# Patient Record
Sex: FEMALE | Race: WHITE | Employment: FULL TIME | URBAN - METROPOLITAN AREA
[De-identification: names, ages, dates, MRNs, and addresses within clinical notes are randomized per-mention and may not be internally consistent; named-entity substitution may affect disease eponyms.]

---

## 2020-08-27 ENCOUNTER — OFFICE VISIT (OUTPATIENT)
Dept: ENDOCRINOLOGY | Age: 40
End: 2020-08-27
Payer: COMMERCIAL

## 2020-08-27 VITALS
HEIGHT: 62 IN | BODY MASS INDEX: 36.07 KG/M2 | OXYGEN SATURATION: 98 % | DIASTOLIC BLOOD PRESSURE: 63 MMHG | SYSTOLIC BLOOD PRESSURE: 125 MMHG | RESPIRATION RATE: 14 BRPM | WEIGHT: 196 LBS | HEART RATE: 66 BPM | TEMPERATURE: 97.3 F

## 2020-08-27 PROBLEM — E04.2 MULTINODULAR GOITER: Status: ACTIVE | Noted: 2020-08-27

## 2020-08-27 PROBLEM — E28.8 HYPERANDROGENISM: Status: ACTIVE | Noted: 2020-08-27

## 2020-08-27 PROBLEM — R63.5 WEIGHT GAIN, ABNORMAL: Status: ACTIVE | Noted: 2020-08-27

## 2020-08-27 PROBLEM — E66.812 CLASS 2 OBESITY IN ADULT: Status: ACTIVE | Noted: 2020-08-27

## 2020-08-27 PROBLEM — L65.9 ALOPECIA: Status: ACTIVE | Noted: 2020-08-27

## 2020-08-27 PROBLEM — E66.9 CLASS 2 OBESITY IN ADULT: Status: ACTIVE | Noted: 2020-08-27

## 2020-08-27 PROCEDURE — 99204 OFFICE O/P NEW MOD 45 MIN: CPT | Performed by: INTERNAL MEDICINE

## 2020-08-27 RX ORDER — NORETHINDRONE ACETATE AND ETHINYL ESTRADIOL 1MG-20(24)
1 KIT ORAL DAILY
COMMUNITY
Start: 2020-07-02 | End: 2021-12-10 | Stop reason: ALTCHOICE

## 2020-08-27 RX ORDER — DESVENLAFAXINE 25 MG/1
TABLET, EXTENDED RELEASE ORAL
COMMUNITY
Start: 2020-08-03

## 2020-08-27 RX ORDER — FOLIC ACID 0.8 MG
TABLET ORAL
COMMUNITY

## 2020-08-27 NOTE — PROGRESS NOTES
SUBJECTIVE:  Abeba Morales is a 46 y.o. female who is being evaluated for multinodular goiter, fatigue, hair loss. Referred by Dr. Dontrell Matta. 1. Multinodular goiter     This started in 2011. Patient was diagnosed with MNG. The problem has been unchanged. Previous thyroid studies include: TSH and free thyroxine. Patient started medication in N/A. Currently patient is on: N/A. Misses N/A doses a month. Patient was prescribed levothyroxine, Synthroid in the past and developed rash, hot spots, itching. She took them only for few weeks    Current complaints: fatigue, weight gain, hair loss, hirsutism, fluctuating mood, constipation, difficulty loosing weight, dry skin, itching    History of obstructive symptoms: difficulty swallowing No, changes in voice/hoarseness No.  History of radiation to patient's neck: No  Resent iodine exposure: No  Family history includes hypothyroidism. Family history of thyroid cancer: No    2. Hyperandrogenism    Differential diagnosis includes PCOS. On BCP  Started periods at 8 yo. Regular, heavy, alternating with spotting. In 2017 started BCP. No periods on BCP. Hirsutism on the face, abdomen. Shoe size changed by 1/2 size, ring size changed  Has decreased sex drive    3. Class 2 obesity with body mass index (BMI) of 35.0 to 35.9 in adult, unspecified obesity type, unspecified whether serious comorbidity present    Gained 15 lbs in 8 lbs. Walking daily  On low carb diet    4. Alopecia  Patient developed severe hair loss. 5. Weight gain, abnormal  Gained 15 lbs in 8 lbs. Patient states that she eats healthy and walks every day. [facility] NON [facility]  Result Impression   1.  Simple right ovarian cyst measuring 2.7 x 2.7 x 2.7 cm.  2.  Otherwise negative transvaginal ultrasound of the pelvis.          1. ACR TI-RADS risk category: TR3 (3 points), mildly suspicious,  for the left nodule with maximum dimension of 1.0 cm. Recommendation: No further imaging follow-up. Clinical followup as necessary.        Reference: ACR TI-RADS recommendations (Lillysler et al., May 2017):  TR5 (> or = 7 points): FNA if  > or = to 1 cm longest axis, follow-up if 0.5 - 0.9 cm every year for 5 years  TR4 (4-6 points): FNA if  > or = to 1.5 cm longest axis, follow-up if 1 - 1.4 cm in 1, 2, 3 and 5 years  TR3 (3 points): FNA if  > or = to 2.5 cm longest axis, follow-up if 1.5 - 2.4 cm in 1, 3 and 5 years  TR2 (2 points) & TR1 (0 points): No FNA or follow-up recommended    Electronically Signed by: Reyes Salvador MD, 8/13/2020 7:43 PM   Result Narrative   EXAMINATION: US-THYROID / NECK     DATE OF EXAM:  8/13/2020 6:31 PM    DEMOGRAPHICS: 46years old Female     INDICATION:     E04.2: Nontoxic multinodular goiter     Reason for Exam:  Nontoxic multinodular goiter. TECHNIQUE: Ultrasound examination of the thyroid was performed. COMPARISON: None. FINDINGS:  Thyroid Size: Right lobe: 4.8 x 1.7 x 1.5 cm; Left lobe: 5.3 x 1.4 x 1.3 cm; Isthmus: 4 mm  Texture: Homogeneous  Vascularity: Normal  Total number of nodules of any character:        Nodule #: 1  Size (3 dimensions): 1.0 x 0.7 x 0.7 cm  Location: left      Composition: solid/almost completely solid (2)  Echogenicity: isoechoic (1)  Shape: not taller-than-wide (0)  Margins: smooth (0)  Echogenic foci: none (0)  Total points and TR risk for above nodule: 3 points. ACR TI-RADS 3. Mildly suspicious. Nodule #: 2  Size (3 dimensions): 0.7 x 0.4 x 0.4 cm  Location: right      Composition: mixed cystic and solid (1)  Echogenicity: hypoechoic (2)  Shape: not taller-than-wide (0)  Margins: smooth (0)  Echogenic foci: large comet-tail artifacts (0)  Total points and TR risk for above nodule: 3 points. ACR TI-RADS 3. Mildly suspicious. Additional findings: Additional nonspecific less than 0.5 cm nodules are identified in each lobe.          US-THYROID / [facility]3] None    Highest education level: None   Occupational History    None   Social Needs    Financial resource strain: None    Food insecurity     Worry: None     Inability: None    Transportation needs     Medical: None     Non-medical: None   Tobacco Use    Smoking status: Former Smoker     Packs/day: 1.00     Years: 5.00     Pack years: 5.00     Types: Cigarettes     Last attempt to quit: 2011     Years since quittin.0    Smokeless tobacco: Never Used   Substance and Sexual Activity    Alcohol use: Yes     Comment: 2-3 per week    Drug use: Never    Sexual activity: Yes     Partners: Male   Lifestyle    Physical activity     Days per week: None     Minutes per session: None    Stress: None   Relationships    Social connections     Talks on phone: None     Gets together: None     Attends Synagogue service: None     Active member of club or organization: None     Attends meetings of clubs or organizations: None     Relationship status: None    Intimate partner violence     Fear of current or ex partner: None     Emotionally abused: None     Physically abused: None     Forced sexual activity: None   Other Topics Concern    None   Social History Narrative    None     Current Outpatient Medications   Medication Sig Dispense Refill    B COMPLEX VITAMINS ER PO Take 1 capsule by mouth daily      desvenlafaxine succinate (PRISTIQ) 25 MG TB24 extended release tablet TAKE 1 TABLET BY MOUTH EVERY DAY      Norethin Ace-Eth Estrad-FE (BLISOVI 24 FE) 1-20 MG-MCG(24) TABS Take 1 tablet by mouth daily      Multiple Vitamins-Minerals (MULTIVITAMIN ADULT PO) Take by mouth      Magnesium 500 MG CAPS Take by mouth      BIOTIN PO Take by mouth       No current facility-administered medications for this visit.       Allergies   Allergen Reactions    Propoxyphene Anaphylaxis    Levothyroxine Rash    Duloxetine      FATIGUE    Sulfa Antibiotics      \"insomnia\"    Codeine Rash    Erythromycin Rash     ABLE kg)     Body mass index is 35.85 kg/m².     OBJECTIVE:  Constitutional: no acute distress, well appearing and well nourished  Psychiatric: oriented to person, place and time, judgement and insight and normal, recent and remote memory and intact and mood and affect are normal  Skin: skin and subcutaneous tissue is normal without mass, normal turgor, has white stria  Head and Face: examination of head and face revealed no abnormalities  Eyes: no lid or conjunctival swelling, erythema or discharge, pupils are normal, equal, round, reactive to light  Ears/Nose: external inspection of ears and nose revealed no abnormalities, hearing is grossly normal  Oropharynx/Mouth/Face: lips, tongue and gums are normal with no lesions, the voice quality was normal  Neck: neck is supple and symmetric, with midline trachea and no masses, thyroid is enlarged  Lymphatics: normal cervical lymph nodes, normal supraclavicular nodes  Pulmonary: no increased work of breathing or signs of respiratory distress, lungs are clear to auscultation  Cardiovascular: normal heart rate and rhythm, normal S1 and S2, no murmurs and pedal pulses and 2+ bilaterally, No edema  Abdomen: abdomen is soft, non-tender with no masses  Musculoskeletal: normal gait and station and exam of the digits and nails are normal  Neurological: normal coordination and normal general cortical function      Lab Review:    No results found for: WBC, HGB, HCT, MCV, PLT  No results found for: NA, K, CL, CO2, BUN, CREATININE, GLUCOSE, CALCIUM, PROT, LABALBU, BILITOT, ALKPHOS, AST, ALT, LABGLOM, GFRAA, AGRATIO, GLOB  No results found for: TSHFT4, TSH, FT3  No results found for: LABA1C  No results found for: EAG  No results found for: CHOL  No results found for: TRIG  No results found for: HDL  No results found for: LDLCHOLESTEROL, LDLCALC  No results found for: LABVLDL, VLDL  No results found for: CHOLHDLRATIO  No results found for: LABMICR, [id number]  No results found for: VITD25 ASSESSMENT/PLAN/recommendations:  1. Multinodular goiter  Recommend follow-up for bilateral thyroid nodules  Patient states that her previous nodule was benign and the disappeared on palpation after it was aspirated in 2011  Reviewed medical record in detail. There was no cytology report. There was a note stating thyroid biopsy on 8/12/2011- cyst on the thyroid. 8/13/2020 thyroid ultrasound  - T3, Free; Future  - T4, Free; Future  - Anti-Thyroglobulin Antibody; Future  - Thyroid Peroxidase Antibody; Future  - TSH without Reflex; Future  -Thyroid sonogram    2. Class 2 obesity with body mass index (BMI) of 35.0 to 35.9 in adult, unspecified obesity type, unspecified whether serious comorbidity present  Diet and exercise    3. Hyperandrogenism  Differential diagnosis includes polycystic ovarian syndrome, patient stated that diagnosis was mentioned by OB/GYN provider  She will obtain lab work 1 month after stopping birth control pills  Then reevaluate  Patient is concerned about abnormal hair growth, hair loss, if birth control pills are causing weight gain, difficulty losing weight  - Comprehensive Metabolic Panel; Future  - T3, Free; Future  - T4, Free; Future  - Anti-Thyroglobulin Antibody; Future  - Thyroid Peroxidase Antibody; Future  - TSH without Reflex; Future  - DHEA-Sulfate; Future  - Testosterone, free, total; Future  - 17-Hydroxyprogesterone; Future  - Prolactin; Future  - ACTH; Future  - Cortisol AM, Total; Future  - Follicle Stimulating Hormone; Future  - Luteinizing Hormone; Future  - Estradiol; Future  - Insulin-Like Growth Factor; Future  - ANTI MULLERIAN HORMONE; Future  - SALIVARY CORTISOL; Future  - SALIVARY CORTISOL; Future  - SALIVARY CORTISOL; Future    4. Alopecia    - Comprehensive Metabolic Panel; Future  - T3, Free; Future  - T4, Free; Future  - Anti-Thyroglobulin Antibody; Future  - Thyroid Peroxidase Antibody; Future  - TSH without Reflex; Future  - DHEA-Sulfate;  Future  - Testosterone, free, total; Future  - 17-Hydroxyprogesterone; Future    5. Weight gain, abnormal  Stop Blisovi for 1 month for testing  - Comprehensive Metabolic Panel; Future  - T3, Free; Future  - T4, Free; Future  - Anti-Thyroglobulin Antibody; Future  - Thyroid Peroxidase Antibody; Future  - TSH without Reflex; Future  - DHEA-Sulfate; Future  - Testosterone, free, total; Future  - 17-Hydroxyprogesterone; Future  - Prolactin; Future  - ACTH; Future  - Cortisol AM, Total; Future  - Follicle Stimulating Hormone; Future  - Luteinizing Hormone; Future  - Estradiol; Future  - Insulin-Like Growth Factor; Future  - ANTI MULLERIAN HORMONE; Future  - SALIVARY CORTISOL; Future  - SALIVARY CORTISOL; Future  - SALIVARY CORTISOL; Future      Reviewed and/or ordered clinical lab results Yes  Reviewed and/or ordered radiology tests Yes   Reviewed and/or ordered other diagnostic tests No  Discussed test results with performing physician No  Independently reviewed image, tracing, or specimen No  Made a decision to obtain old records No  Reviewed and summarized old records Yes    Thyroid disease   thyroid biopsy 8/12/11- cyst on thyroid   8/13/2020 thyroid ultrasound  Left 1 cm and right 0.7 cm thyroid nodules  25 hydroxy vitamin D 48.1  TSH 1.028  Hemoglobin A1c 5.8  Cortisol a.m. 16.1  TPO antibody and thyroglobulin antibodies negative  FINDINGS:    UTERUS: Retroverted and measures 5.7 x 4.4 x 3.5 cm. The endometrial stripe thickness measures up to 3.5 mm. There are no uterine fibroids. RIGHT OVARY: Measures 4.0 x 2.4 x 3.4 cm with a volume of 16.55 mL. There is a simple right ovarian cyst measuring 2.7 x 2.7 x 2.7 cm. There is normal blood flow. LEFT OVARY: Measures 2.7 x 1.2 x 1.4 cm with a volume of 2.27 mL. Physiologic follicles are seen within the left ovary. There is normal blood flow. PELVIS: There is no free fluid.   Obtained history from other than patient Gretel Avila was counseled regarding symptoms

## 2020-09-27 LAB
A/G RATIO: 1.8 (ref 1–2)
ALBUMIN SERPL-MCNC: 6.8 G/DL (ref 6–8.3)
ALBUMIN SERUM: 4.4 G/DL (ref 3.5–5.7)
ALP BLD-CCNC: 62 U/L (ref 34–104)
ALT SERPL-CCNC: 32 U/L (ref 7–52)
ANION GAP SERPL CALCULATED.3IONS-SCNC: 8 MMOL/L (ref 7–16)
AST SERPL-CCNC: 21 U/L (ref 15–39)
BILIRUBIN: 0.4 MG/DL (ref 0.3–1)
BUN BLDV-MCNC: 16 MG/DL (ref 7–25)
CALCIUM SERPL-MCNC: 9.1 MG/DL (ref 8.6–10.2)
CHLORIDE BLD-SCNC: 107 MMOL/L (ref 98–107)
CO2: 24 MMOL/L (ref 21–31)
CORTISOL - AM: 9.8 UG/DL (ref 6.7–22.6)
CREATININE + EGFR PANEL: 0.8 MG/DL (ref 0.6–1.2)
ESTRADIOL, SENSITIVE: 26.9 PG/ML
FOLLICLE STIMULATING HORMONE: 6.6 MU/ML
GFR AFRICAN AMERICAN: > 60 ML/MIN/1.73M2
GFR NON-AFRICAN AMERICAN: > 60 ML/MIN/1.73M2
GLOBULIN: 2.4 G/DL (ref 2.6–4.2)
GLUCOSE: 142 MG/DL (ref 74–109)
LUTEINIZING HORMONE: 2.3 MIU/ML
POTASSIUM SERPL-SCNC: 3.9 MMOL/L (ref 3.5–5.3)
PROLACTIN: 4.5 NG/ML (ref 3.3–26.7)
SODIUM BLD-SCNC: 139 MMOL/L (ref 136–145)
T3 FREE: 3.2 PG/ML (ref 2.5–3.9)
T4 FREE: 0.74 NG/DL (ref 0.61–1.12)
TSH SERPL DL<=0.05 MIU/L-ACNC: 0.88 UIU/ML (ref 0.45–5.33)

## 2020-09-29 LAB
ADRENOCORTICOTROPIC HORMONE: 35.3 PG/ML (ref 7.2–63.3)
DHEAS (DHEA SULFATE): 230 UG/DL (ref 57.3–279.2)
THYROGLOBULIN AB: <1 IU/ML (ref 0–0.9)
THYROID PEROXIDASE ANTIBODIES: <9 IU/ML (ref 0–34)
THYROID PEROXIDASE ANTIBODIES: <9 IU/ML (ref 0–34)

## 2020-09-30 LAB
INSULIN-LIKE GROWTH FACTOR-1: 120 NG/ML (ref 79–259)
TESTOSTERONE FREE-MALE: 9.4 PG/ML (ref 0–4.2)
TESTOSTERONE TOTAL-MALE: 51 NG/DL (ref 8–48)

## 2020-10-02 LAB — Lab: 1.81 NG/ML

## 2020-10-09 ENCOUNTER — OFFICE VISIT (OUTPATIENT)
Dept: ENDOCRINOLOGY | Age: 40
End: 2020-10-09
Payer: COMMERCIAL

## 2020-10-09 VITALS
BODY MASS INDEX: 36.44 KG/M2 | OXYGEN SATURATION: 100 % | HEIGHT: 62 IN | WEIGHT: 198 LBS | SYSTOLIC BLOOD PRESSURE: 130 MMHG | DIASTOLIC BLOOD PRESSURE: 88 MMHG | TEMPERATURE: 97.2 F | RESPIRATION RATE: 14 BRPM | HEART RATE: 73 BPM

## 2020-10-09 PROBLEM — E11.9 CONTROLLED TYPE 2 DIABETES MELLITUS (HCC): Status: ACTIVE | Noted: 2020-10-09

## 2020-10-09 PROBLEM — E28.2 PCOS (POLYCYSTIC OVARIAN SYNDROME): Status: ACTIVE | Noted: 2020-08-27

## 2020-10-09 PROCEDURE — 99215 OFFICE O/P EST HI 40 MIN: CPT | Performed by: INTERNAL MEDICINE

## 2020-10-09 NOTE — PROGRESS NOTES
SUBJECTIVE:  Kathleen Mccullough is a 45 y.o. female who is being evaluated for multinodular goiter, fatigue, hair loss. 1. Multinodular goiter     This started in 2011. Patient was diagnosed with MNG. The problem has been unchanged. Previous thyroid studies include: TSH and free thyroxine. Patient started medication in N/A. Currently patient is on: N/A. Misses N/A doses a month. Patient was prescribed levothyroxine, Synthroid in the past and developed rash, hot spots, itching. She took them only for few weeks    Current complaints: fatigue, weight gain, hair loss, hirsutism, fluctuating mood, constipation, difficulty loosing weight, dry skin, itching    History of obstructive symptoms: difficulty swallowing No, changes in voice/hoarseness No.  History of radiation to patient's neck: No  Resent iodine exposure: No  Family history includes hypothyroidism. Family history of thyroid cancer: No    2. Hyperandrogenism    Differential diagnosis includes PCOS. On BCP  Started periods at 6 yo. Regular, heavy, alternating with spotting. In 2017 started BCP. No periods on BCP. Hirsutism on the face, abdomen. Shoe size changed by 1/2 size, ring size changed  Has decreased sex drive    3. Obesity  Gained 15 lbs in 8 lbs. Walking daily  On low carb diet    4. Alopecia  Patient developed severe hair loss. 5. Weight gain, abnormal  Gained 15 lbs  Patient states that she eats healthy and walks every day. 6.  Type 2 Diabetes Mellitus, controlled  Glucose 142  Dx with Type 2 diabetes in 2008  Took Metformin for a while  Stopped because diabetes  was controlled    7. PCOS  Has hirsutism, irregular periods, hair loss, acne       [facility]12[facility]2018  [facility]  Result Impression   1.  Simple right ovarian cyst measuring 2.7 x 2.7 x 2.7 cm.  2.  Otherwise negative transvaginal ultrasound of the pelvis.          1. ACR TI-RADS risk category: TR3 (3 points), mildly suspicious,  for the left nodule with maximum dimension of 1.0 cm. Recommendation: No further imaging follow-up. Clinical followup as necessary.        Reference: ACR TI-RADS recommendations (Tessler et al., May 2017):  TR5 (> or = 7 points): FNA if  > or = to 1 cm longest axis, follow-up if 0.5 - 0.9 cm every year for 5 years  TR4 (4-6 points): FNA if  > or = to 1.5 cm longest axis, follow-up if 1 - 1.4 cm in 1, 2, 3 and 5 years  TR3 (3 points): FNA if  > or = to 2.5 cm longest axis, follow-up if 1.5 - 2.4 cm in 1, 3 and 5 years  TR2 (2 points) & TR1 (0 points): No FNA or follow-up recommended    Electronically Signed by: Diana Weinstein MD, 8/13/2020 7:43 PM   Result Narrative   EXAMINATION: US-THYROID / NECK     DATE OF EXAM:  8/13/2020 6:31 PM    DEMOGRAPHICS: 46years old Female     INDICATION:     E04.2: Nontoxic multinodular goiter     Reason for Exam:  Nontoxic multinodular goiter. TECHNIQUE: Ultrasound examination of the thyroid was performed. COMPARISON: None. FINDINGS:  Thyroid Size: Right lobe: 4.8 x 1.7 x 1.5 cm; Left lobe: 5.3 x 1.4 x 1.3 cm; Isthmus: 4 mm  Texture: Homogeneous  Vascularity: Normal  Total number of nodules of any character:        Nodule #: 1  Size (3 dimensions): 1.0 x 0.7 x 0.7 cm  Location: left      Composition: solid/almost completely solid (2)  Echogenicity: isoechoic (1)  Shape: not taller-than-wide (0)  Margins: smooth (0)  Echogenic foci: none (0)  Total points and TR risk for above nodule: 3 points. ACR TI-RADS 3. Mildly suspicious. Nodule #: 2  Size (3 dimensions): 0.7 x 0.4 x 0.4 cm  Location: right      Composition: mixed cystic and solid (1)  Echogenicity: hypoechoic (2)  Shape: not taller-than-wide (0)  Margins: smooth (0)  Echogenic foci: large comet-tail artifacts (0)  Total points and TR risk for above nodule: 3 points. ACR TI-RADS 3. Mildly suspicious. Additional findings: Additional nonspecific less than 0.5 cm nodules are identified in each lobe. US-THYROID / [id number] [facility]  Result Narrative   FINDINGS:  Multiple sonographic images of the thyroid gland were obtained.      The right thyroid lobe measures 4.2 x 2.1 x 1.4 cm.  The left thyroid lobe measures 4.6 x 1.6 x 1.1 cm.  The isthmus measures 4.2 mm. There are multiple hypoechoic nodules diffusely scattered throughout both thyroid lobes.  In the right thyroid lobe, there is a hypoechoic oval nodule in the superior pole measuring 4.8 x 3.4 x 2.9 mm.  In the left thyroid lobe, there is a 3 mm   hypoechoic nodule in the superior pole, a 4 mm hypoechoic nodule in the mid to inferior pole, and a nodule of mixed echogenicity in the inferior pole measuring 7.5 x 6.3 x 4.9 mm.  In addition, the largest hypoechoic nodule is in the right aspect of the   isthmus measuring 1.8 x 1.3 x 0.8 cm. IMPRESSION:  The thyroid gland is enlarged with multiple nodules scattered throughout, as described above.   ** Final **    Dictated by: Jaimee Jameson               @2011 3:08 pm  Electronically Signed by: Jaimee Jameson  @ 2011 4:06 pm  Interpreted by Staff Physician: Jaimee Jameson         Past Medical History:   Diagnosis Date    Depression     Diabetes mellitus ([facility] [address]      Patient Active Problem List    Diagnosis Date Noted    Controlled type 2 diabetes mellitus ([facility] [address] 10/09/2020    Multinodular goiter 2020    Class 2 obesity in adult 2020    PCOS (polycystic ovarian syndrome) 2020    Alopecia 2020    Weight gain, abnormal 2020     Past Surgical History:   Procedure Laterality Date    BREAST LUMPECTOMY Right     begnin biopsy     SECTION      FINE NEEDLE ASPIRATION      thyroid    LEEP       Family History   Problem Relation Age of Onset    Cancer Mother         breast    Diabetes type 2  Father     Cancer Maternal Grandmother         breast    Cancer Maternal Grandfather         colon     Social History BIOTIN PO Take by mouth       No current facility-administered medications for this visit.       Allergies   Allergen Reactions    Propoxyphene Anaphylaxis    Levothyroxine Rash    Duloxetine      FATIGUE    Sulfa Antibiotics      \"insomnia\"    Codeine Rash    Erythromycin Rash     ABLE TO TOLERATE AZITHROMYCIN    Penicillins Rash     Family Status   Relation Name Status    Mother  (Not Specified)    Father  (Not Specified)    MGM  (Not Specified)    MGF  (Not Specified)       Review of Systems:  Constitutional: has fatigue, no fever, has recent weight gain, no recent weight loss, no changes in appetite  Eyes: no eye pain, no change in vision, no eye redness, no eye irritation, no double vision  Ears, nose, throat: no nasal congestion, no sore throat, no earache, no decrease in hearing, no hoarseness, no dry mouth, no sinus problems, no difficulty swallowing, no ringing in ears  Pulmonary: no shortness of breath, no wheezing, no dyspnea on exertion, no cough  Cardiovascular: no chest pain, no lower extremity edema, no orthopnea, no intermittent leg claudication, no palpitations  Gastrointestinal: no abdominal pain, no nausea, no vomiting, no diarrhea, has constipation, no dysphagia, no heartburn, no bloating  Genitourinary: no dysuria, no urinary incontinence, no urinary hesitancy, no urinary frequency, no feelings of urinary urgency, no nocturia  Musculoskeletal: no joint swelling, no joint stiffness, no joint pain, no muscle cramps, no muscle pain, no bone pain  Integument/Breast: has hair loss, no skin rashes, no skin lesions, has itching, has dry skin  Neurological: no numbness, no tingling, no weakness, no confusion, no headaches, no dizziness, no fainting, no tremors, no decrease in memory, no balance problems  Psychiatric: no anxiety, no depression, no insomnia  Hematologic/Lymphatic: no tendency for easy bleeding, no swollen lymph nodes, no tendency for easy bruising  Immunology: no seasonal allergies, no frequent infections, no frequent illnesses  Endocrine: no temperature intolerance    /88   Pulse 73   Temp 97.2 °F (36.2 °C)   Resp 14   Ht 5' 2\" (1.575 m)   Wt 198 lb (89.8 kg)   LMP 10/08/2020   SpO2 100%   BMI 36.21 kg/m²    Wt Readings from Last 3 Encounters:   10/09/20 198 lb (89.8 kg)   08/27/20 196 lb (88.9 kg)     Body mass index is 36.21 kg/m².     OBJECTIVE:  Constitutional: no acute distress, well appearing and well nourished  Psychiatric: oriented to person, place and time, judgement and insight and normal, recent and remote memory and intact and mood and affect are normal  Skin: skin and subcutaneous tissue is normal without mass, normal turgor, has white stria  Head and Face: examination of head and face revealed no abnormalities  Eyes: no lid or conjunctival swelling, erythema or discharge, pupils are normal, equal, round, reactive to light  Ears/Nose: external inspection of ears and nose revealed no abnormalities, hearing is grossly normal  Oropharynx/Mouth/Face: lips, tongue and gums are normal with no lesions, the voice quality was normal  Neck: neck is supple and symmetric, with midline trachea and no masses, thyroid is enlarged  Lymphatics: normal cervical lymph nodes, normal supraclavicular nodes  Pulmonary: no increased work of breathing or signs of respiratory distress, lungs are clear to auscultation  Cardiovascular: normal heart rate and rhythm, normal S1 and S2, no murmurs and pedal pulses and 2+ bilaterally, No edema  Abdomen: abdomen is soft, non-tender with no masses  Musculoskeletal: normal gait and station and exam of the digits and nails are normal  Neurological: normal coordination and normal general cortical function      Lab Review:    No results found for: WBC, HGB, HCT, MCV, PLT  Lab Results   Component Value Date     09/27/2020    K 3.9 09/27/2020     09/27/2020    CO2 24 09/27/2020    BUN 16 09/27/2020    GLUCOSE 142 09/27/2020    CALCIUM 9.1 09/27/2020    LABALBU 6.8 09/27/2020    LABALBU 4.4 09/27/2020    BILITOT 0.4 09/27/2020    ALKPHOS 62 09/27/2020    AST 21 09/27/2020    ALT 32 09/27/2020    LABGLOM > 60 09/27/2020    GFRAA > 60 09/27/2020    AGRATIO 1.8 09/27/2020    GLOB 2.4 09/27/2020     Lab Results   Component Value Date    TSH 0.880 09/27/2020    FT3 3.2 09/27/2020     No results found for: LABA1C  No results found for: EAG  No results found for: CHOL  No results found for: TRIG  No results found for: HDL  No results found for: LDLCHOLESTEROL, LDLCALC  No results found for: LABVLDL, VLDL  No results found for: CHOLHDLRATIO  No results found for: LABMICR, [id number]  No results found for: VITD25     ASSESSMENT/PLAN/recommendations:  1. Multinodular goiter  TSH 0.88  8/13/2020 thyroid ultrasound  Left 1 cm and right 0.7 cm thyroid nodules  RAdiology recommendation:  No further imaging follow-up. Clinical followup as necessary.    -Thyroid sonogram    2. Obesity  Diet and exercise    3. Hyperandrogenism  Total testosterone 51  ACTH 35  Prolactin 4.5  Patient is concerned about abnormal hair growth, hair loss, acne  Patient's OB/GYN and discussed PCOS with her. Patient stated that she has PCOS according to OB/GYN.  - DHEA-Sulfate; Future  - Testosterone, free, total; Future    4. Alopecia  - Comprehensive Metabolic Panel; Future  - T3, Free; Future  - T4, Free; Future  - TSH without Reflex; Future  - DHEA-Sulfate; Future  - Testosterone, free, total; Future    5. Weight gain, abnormal  Diet, exercise    6. Diabetes mellitus type 2, controlled  Glucose 142  Management per PCP    7.   PCOS  Start metformin 500 mg daily, increase as tolerated to twice a day  -Testosterone  - DHEAS  -CMP  - Insulin    Reviewed and/or ordered clinical lab results Yes  Reviewed and/or ordered radiology tests Yes   Reviewed and/or ordered other diagnostic tests No  Discussed test results with performing physician No  Independently reviewed image, tracing, or specimen No  Made a decision to obtain old records No  Reviewed and summarized old records Yes   Recommend follow-up for bilateral thyroid nodules  Patient states that her previous nodule was benign and the disappeared on palpation after it was aspirated in 2011  Reviewed medical record in detail. There was no cytology report. There was a note stating thyroid biopsy on 8/12/2011- cyst on the thyroid.  Thyroid disease   thyroid biopsy 8/12/11- cyst on thyroid   8/13/2020 thyroid ultrasound  Left 1 cm and right 0.7 cm thyroid nodules  25 hydroxy vitamin D 48.1  TSH 1.028  Hemoglobin A1c 5.8  Cortisol a.m. 16.1  TPO antibody and thyroglobulin antibodies negative  FINDINGS:    UTERUS: Retroverted and measures 5.7 x 4.4 x 3.5 cm. The endometrial stripe thickness measures up to 3.5 mm. There are no uterine fibroids. RIGHT OVARY: Measures 4.0 x 2.4 x 3.4 cm with a volume of 16.55 mL. There is a simple right ovarian cyst measuring 2.7 x 2.7 x 2.7 cm. There is normal blood flow. LEFT OVARY: Measures 2.7 x 1.2 x 1.4 cm with a volume of 2.27 mL. Physiologic follicles are seen within the left ovary. There is normal blood flow. PELVIS: There is no free fluid. Obtained history from other than patient No    Hawa Colón was counseled regarding symptoms of multinodular goiter, PCOS, hyperandrogenism, hair loss, abnormal weight gain diagnosis, course and complications of disease if inadequately treated, side effects of medications, diagnosis, treatment options, and prognosis, risks, benefits, complications, and alternatives of treatment, labs, imaging and other studies and treatment targets and goals, polycystic ovarian syndrome diagnostic criteria, treatment options, metformin, thyroid cancer risk and thyroid nodules, monitoring. She understands instructions and counseling.     Total visit time 40 minutes, more than 50% was face-to-face counseling time  See assessment, plan and counseling note for details    Return in about 3 months (around 1/9/2021) for PCOS, thyroid problems.     Electronically signed by Lion Tellez MD on 10/9/2020 at 2:37 PM

## 2020-10-27 ENCOUNTER — PATIENT MESSAGE (OUTPATIENT)
Dept: ENDOCRINOLOGY | Age: 40
End: 2020-10-27

## 2020-10-27 NOTE — TELEPHONE ENCOUNTER
From: Matthew Esquivel  To: Nain Clay MD  Sent: 10/27/2020 7:59 AM EDT  Subject: Visit Follow-Up Question    Good Morning! I am still tolerating the Metformin just fine at 3 tablets a day. I did want to move forward with what you discussed about managing my diabetes. I need help losing weight. And I know you had discussed there being options to help me get some of this weight off. I would like to move to work with you on my diabetes as soon as possible. Can you please let me know what I need to do? Thank you, have a great day!

## 2020-11-18 NOTE — TELEPHONE ENCOUNTER
Requested Prescriptions     Pending Prescriptions Disp Refills    metFORMIN (GLUCOPHAGE) 500 MG tablet 180 tablet 1     Sig: Take 1 tablet by mouth 2 times daily (with meals)     LAST OV: 10/09/2020  LAST REFILL: 10/09/2020  NEXT OV: 12/28/2020

## 2020-12-27 PROBLEM — E11.9 DIABETES MELLITUS TYPE 2, CONTROLLED, WITHOUT COMPLICATIONS (HCC): Status: ACTIVE | Noted: 2020-12-27

## 2020-12-28 ENCOUNTER — OFFICE VISIT (OUTPATIENT)
Dept: ENDOCRINOLOGY | Age: 40
End: 2020-12-28
Payer: COMMERCIAL

## 2020-12-28 VITALS
WEIGHT: 197 LBS | TEMPERATURE: 97.4 F | HEART RATE: 60 BPM | SYSTOLIC BLOOD PRESSURE: 120 MMHG | RESPIRATION RATE: 14 BRPM | HEIGHT: 62 IN | DIASTOLIC BLOOD PRESSURE: 90 MMHG | BODY MASS INDEX: 36.25 KG/M2

## 2020-12-28 PROCEDURE — 99215 OFFICE O/P EST HI 40 MIN: CPT | Performed by: INTERNAL MEDICINE

## 2020-12-28 NOTE — PROGRESS NOTES
Margaux Arredondo is a 45 y.o. female who is being evaluated for Type 2 diabetes mellitus. Current symptoms/problems include fatigue and are worsening. Controlled type 2 diabetes mellitus without complication, without long-term current use of insulin ([facility] [E11.9]    Diagnosed with Type 2 diabetes mellitus in 2008. Comorbid conditions: none    Current diabetic medications include: Metformin 500 mg tid  Glucose 142  Took Metformin for a while  Stopped because diabetes  was controlled  Now back on Metformin    Intolerance to diabetes medications: No  Has skin reactions  Weight trend: stable  Prior visit with dietician: yes  Current diet: on average, 3 meals per day  Current exercise: active     Current monitoring regimen: home blood tests - 1 times daily  Has brought blood glucose log/meter:  No  Home blood sugar records: fasting range: 130-140 and postprandial range:    Any episodes of hypoglycemia? No  Hypoglycemia frequency and time(s):    Does patient have Glucagon emergency kit? No  Does patient have rapid acting carbohydrate? No  Does patient wear a medic alert bracelet or necklace? Yes    2. Multinodular goiter      This started in 2011. Patient was diagnosed with MNG. The problem has been unchanged. Previous thyroid studies include: TSH and free thyroxine. Patient started medication in N/A. Currently patient is on: N/A. Misses N/A doses a month.     Patient was prescribed levothyroxine, Synthroid in the past and developed rash, hot spots, itching. She took them only for few weeks     Current complaints: fatigue, hair loss, hirsutism, fluctuating mood, constipation, difficulty loosing weight, dry skin, itching     History of obstructive symptoms: difficulty swallowing No, changes in voice/hoarseness No.  History of radiation to patient's neck: No  Resent iodine exposure: No  Family history includes hypothyroidism. Family history of thyroid cancer: No     3.   PCOS  Has hirsutism, irregular periods, hair loss, acne  On BCP, continuous pack without period  Differential diagnosis includes PCOS. Started periods at 6 yo. Regular, heavy, alternating with spotting, now no period  In 2017 started BCP. No periods on BCP. Hirsutism on the face, abdomen. Shoe size changed by 1/2 size, ring size changed  Has decreased sex drive     4. Obesity  Gained 15 lbs in 8 lbs. Active  On low carb diet     5. Alopecia  Patient developed severe hair loss. US-PELVIS TRANSVAGINAL NON OB11/12/2018  [facility]  Result Impression   1.  Simple right ovarian cyst measuring 2.7 x 2.7 x 2.7 cm.  2.  Otherwise negative transvaginal ultrasound of the pelvis.            1. ACR TI-RADS risk category: TR3 (3 points), mildly suspicious,  for the left nodule with maximum dimension of 1.0 cm. Recommendation: No further imaging follow-up. Clinical followup as necessary.        Reference: ACR TI-RADS recommendations (Lillysler et al., May 2017):  TR5 (> or = 7 points): FNA if  > or = to 1 cm longest axis, follow-up if 0.5 - 0.9 cm every year for 5 years  TR4 (4-6 points): FNA if  > or = to 1.5 cm longest axis, follow-up if 1 - 1.4 cm in 1, 2, 3 and 5 years  TR3 (3 points): FNA if  > or = to 2.5 cm longest axis, follow-up if 1.5 - 2.4 cm in 1, 3 and 5 years  TR2 (2 points) & TR1 (0 points): No FNA or follow-up recommended    Electronically Signed by: Ivy Cho MD, 8/13/2020 7:43 PM   Result Narrative   EXAMINATION: US-THYROID / NECK     DATE OF EXAM:  8/13/2020 6:31 PM    DEMOGRAPHICS: 46years old Female     INDICATION:     E04.2: Nontoxic multinodular goiter     Reason for Exam:  Nontoxic multinodular goiter. TECHNIQUE: Ultrasound examination of the thyroid was performed. COMPARISON: None.     FINDINGS:  Thyroid Size: Right lobe: 4.8 x 1.7 x 1.5 cm; Left lobe: 5.3 x 1.4 x 1.3 cm; Isthmus: 4 mm  Texture: Homogeneous  Vascularity: Normal  Total number of nodules of any character:        Nodule #: 1  Size (3 dimensions): 1.0 x 0.7 x 0.7 cm  Location: left      Composition: solid/almost completely solid (2)  Echogenicity: isoechoic (1)  Shape: not taller-than-wide (0)  Margins: smooth (0)  Echogenic foci: none (0)  Total points and TR risk for above nodule: 3 points. ACR TI-RADS 3. Mildly suspicious. Nodule #: 2  Size (3 dimensions): 0.7 x 0.4 x 0.4 cm  Location: right      Composition: mixed cystic and solid (1)  Echogenicity: hypoechoic (2)  Shape: not taller-than-wide (0)  Margins: smooth (0)  Echogenic foci: large comet-tail artifacts (0)  Total points and TR risk for above nodule: 3 points. ACR TI-RADS 3. Mildly suspicious. Additional findings: Additional nonspecific less than 0.5 cm nodules are identified in each lobe.           US-THYROID / [address] [place] [address]  Result Narrative   FINDINGS:  Multiple sonographic images of the thyroid gland were obtained.      The right thyroid lobe measures 4.2 x 2.1 x 1.4 cm.  The left thyroid lobe measures 4.6 x 1.6 x 1.1 cm.  The isthmus measures 4.2 mm. There are multiple hypoechoic nodules diffusely scattered throughout both thyroid lobes.  In the right thyroid lobe, there is a hypoechoic oval nodule in the superior pole measuring 4.8 x 3.4 x 2.9 mm.  In the left thyroid lobe, there is a 3 mm   hypoechoic nodule in the superior pole, a 4 mm hypoechoic nodule in the mid to inferior pole, and a nodule of mixed echogenicity in the inferior pole measuring 7.5 x 6.3 x 4.9 mm.  In addition, the largest hypoechoic nodule is in the right aspect of the   isthmus measuring 1.8 x 1.3 x 0.8 cm. IMPRESSION:  The thyroid gland is enlarged with multiple nodules scattered throughout, as described above.   ** Final **    Dictated by: Bailey Gomes               @07/08/2011 3:08 pm  Electronically Signed by: Bailey Gomes  @ 07/08/2011 4:06 pm  Interpreted by Staff Physician: Bailey Gomes       Past Medical History:   Diagnosis Date    Depression     [facility]       Patient Active Problem List   Diagnosis    Multinodular goiter    Class 2 obesity in adult    PCOS (polycystic ovarian syndrome)    Alopecia    Diabetes mellitus type 2, controlled, without complications ([facility])     Past Surgical History:   Procedure Laterality Date    BREAST LUMPECTOMY Right     begnin biopsy     SECTION      FINE NEEDLE ASPIRATION      thyroid    LEEP       Social History     Socioeconomic History    Marital status:      Spouse name: Not on file    Number of children: Not on file    Years of education: Not on file    Highest education level: Not on file   Occupational History    Not on file   Social Needs    Financial resource strain: Not on file    Food insecurity     Worry: Not on file     Inability: Not on file    Transportation needs     Medical: Not on file     Non-medical: Not on file   Tobacco Use    Smoking status: Former Smoker     Packs/day: 1.00     Years: 5.00     Pack years: 5.00     Types: Cigarettes     Quit date: 2011     Years since quittin.3    Smokeless tobacco: Never Used   Substance and Sexual Activity    Alcohol use: Yes     Comment: 2-3 per week    Drug use: Never    Sexual activity: Yes     Partners: Male   Lifestyle    Physical activity     Days per week: Not on file     Minutes per session: Not on file    Stress: Not on file   Relationships    Social connections     Talks on phone: Not on file     Gets together: Not on file     Attends Denominational service: Not on file     Active member of club or organization: Not on file     Attends meetings of clubs or organizations: Not on file     Relationship status: Not on file    Intimate partner violence     Fear of current or ex partner: Not on file     Emotionally abused: Not on file     Physically abused: Not on file     Forced sexual activity: Not on file   Other Topics Concern    Not on file   Social History Narrative    Not on file Family History   Problem Relation Age of Onset    Cancer Mother         breast    Diabetes type 2  Father     Cancer Maternal Grandmother         breast    Cancer Maternal Grandfather         colon     Current Outpatient Medications   Medication Sig Dispense Refill    metFORMIN (GLUCOPHAGE) 500 MG tablet TAKE 2 TABLET BY MOUTH  TWICE DAILY 360 tablet 3    Probiotic Product (PROBIOTIC-10 PO) Take by mouth      B COMPLEX VITAMINS ER PO Take 1 capsule by mouth daily      desvenlafaxine succinate (PRISTIQ) 25 MG TB24 extended release tablet TAKE 1 TABLET BY MOUTH EVERY DAY      Norethin Ace-Eth Estrad-FE (BLISOVI 24 FE) 1-20 MG-MCG(24) TABS Take 1 tablet by mouth daily      Multiple Vitamins-Minerals (MULTIVITAMIN ADULT PO) Take by mouth      Magnesium 500 MG CAPS Take by mouth      BIOTIN PO Take by mouth       No current facility-administered medications for this visit.       Allergies   Allergen Reactions    Propoxyphene Anaphylaxis    Levothyroxine Rash    Duloxetine      FATIGUE    Sulfa Antibiotics      \"insomnia\"    Codeine Rash    Erythromycin Rash     ABLE TO TOLERATE AZITHROMYCIN    Penicillins Rash     Family Status   Relation Name Status    Mother  (Not Specified)    Father  (Not Specified)    MGM  (Not Specified)    MGF  (Not Specified)       Lab Review:    No results found for: WBC, HGB, HCT, MCV, PLT  Lab Results   Component Value Date     09/27/2020    K 3.9 09/27/2020     09/27/2020    CO2 24 09/27/2020    BUN 16 09/27/2020    GLUCOSE 142 09/27/2020    CALCIUM 9.1 09/27/2020    LABALBU 6.8 09/27/2020    LABALBU 4.4 09/27/2020    BILITOT 0.4 09/27/2020    ALKPHOS 62 09/27/2020    AST 21 09/27/2020    ALT 32 09/27/2020    LABGLOM > 60 09/27/2020    GFRAA > 60 09/27/2020    AGRATIO 1.8 09/27/2020    GLOB 2.4 09/27/2020     Lab Results   Component Value Date    TSH 0.880 09/27/2020    FT3 3.2 09/27/2020     No results found for: LABA1C  No results found for: EAG  No results found for: CHOL  No results found for: TRIG  No results found for: HDL  No results found for: LDLCHOLESTEROL, LDLCALC  No results found for: LABVLDL, VLDL  No results found for: CHOLHDLRATIO  No results found for: Nellene Deutscher  No results found for: VITD25     Review of Systems:  Constitutional: has fatigue, no fever, has recent weight gain, no recent weight loss, no changes in appetite  Eyes: no eye pain, no change in vision, no eye redness, no eye irritation, no double vision  Ears, nose, throat: no nasal congestion, no sore throat, no earache, no decrease in hearing, no hoarseness, no dry mouth, no sinus problems, no difficulty swallowing, no ringing in ears  Pulmonary: no shortness of breath, no wheezing, no dyspnea on exertion, no cough  Cardiovascular: no chest pain, no lower extremity edema, no orthopnea, no intermittent leg claudication, no palpitations  Gastrointestinal: no abdominal pain, no nausea, no vomiting, no diarrhea, has constipation, no dysphagia, no heartburn, no bloating  Genitourinary: no dysuria, no urinary incontinence, no urinary hesitancy, no urinary frequency, no feelings of urinary urgency, no nocturia  Musculoskeletal: no joint swelling, no joint stiffness, no joint pain, no muscle cramps, no muscle pain, no bone pain  Integument/Breast: has hair loss, no skin rashes, no skin lesions, has itching, has dry skin  Neurological: no numbness, no tingling, no weakness, no confusion, no headaches, no dizziness, no fainting, no tremors, no decrease in memory, no balance problems  Psychiatric: no anxiety, no depression, no insomnia  Hematologic/Lymphatic: no tendency for easy bleeding, no swollen lymph nodes, no tendency for easy bruising  Immunology: no seasonal allergies, no frequent infections, no frequent illnesses  Endocrine: no temperature intolerance       BP (!) 120/90   Pulse 60   Temp 97.4 °F (36.3 °C)   Resp 14   Ht 5' 2\" (1.575 m)   Wt 197 lb (89.4 kg)   BMI 36.03 kg/m²    Wt Readings from Last 3 Encounters:   12/28/20 197 lb (89.4 kg)   10/09/20 198 lb (89.8 kg)   08/27/20 196 lb (88.9 kg)     Body mass index is 36.03 kg/m². OBJECTIVE:  Constitutional: no acute distress, well appearing and well nourished  Psychiatric: oriented to person, place and time, judgement and insight and normal, recent and remote memory and intact and mood and affect are normal  Skin: skin and subcutaneous tissue is normal without mass, normal turgor, has white stria  Head and Face: examination of head and face revealed no abnormalities  Eyes: no lid or conjunctival swelling, erythema or discharge, pupils are normal, equal, round, reactive to light  Ears/Nose: external inspection of ears and nose revealed no abnormalities, hearing is grossly normal  Oropharynx/Mouth/Face: lips, tongue and gums are normal with no lesions, the voice quality was normal  Neck: neck is supple and symmetric, with midline trachea and no masses, thyroid is enlarged  Lymphatics: normal cervical lymph nodes, normal supraclavicular nodes  Pulmonary: no increased work of breathing or signs of respiratory distress, lungs are clear to auscultation  Cardiovascular: normal heart rate and rhythm, normal S1 and S2, no murmurs and pedal pulses and 2+ bilaterally, No edema  Abdomen: abdomen is soft, non-tender with no masses  Musculoskeletal: normal gait and station and exam of the digits and nails are normal  Neurological: normal coordination and normal general cortical function     Visual inspection:  Deformity/amputation: absent  Skin lesions/pre-ulcerative calluses: absent  Edema: right- negative, left- negative    Sensory exam:  Monofilament sensation: normal  (minimum of 5 random plantar locations tested, avoiding callused areas - > 1 area with absence of sensation is + for neuropathy)    Plus at least one of the following:  Pulses: normal  Proprioception: Intact  Vibration (128 Hz): Intact    ASSESSMENT/PLAN:    1. medications for diabetes treatment, side effects, benefits, PCOS. She understands instructions and counseling. These diagnosis were discussed and reviewed with the patient including the advantages of drug therapy. She was counseled at this visit on the following: diabetes complication prevention and foot care. Total visit time 40 minutes, more than 50% was counseling time  See assessment, plan and counseling note for details    Return in about 2 weeks (around 1/11/2021) for diabetes, thyroid problems.     Electronically signed by Lorelei Khan MD on 12/28/2020 at 11:14 PM

## 2021-01-05 ENCOUNTER — TELEPHONE (OUTPATIENT)
Dept: ENDOCRINOLOGY | Age: 41
End: 2021-01-05

## 2021-01-05 DIAGNOSIS — E28.2 PCOS (POLYCYSTIC OVARIAN SYNDROME): Primary | ICD-10-CM

## 2021-01-05 DIAGNOSIS — E04.2 MULTINODULAR GOITER: ICD-10-CM

## 2021-01-06 NOTE — TELEPHONE ENCOUNTER
Patient did lab work in rheumatology office. There are several missing labs. Will reorder. Patient is undergoing rheumatology work-up. She will notify me when work-up is completed. Then consider starting GLP-1 agonist.  Mail orders to patient.

## 2021-06-07 DIAGNOSIS — E28.2 PCOS (POLYCYSTIC OVARIAN SYNDROME): ICD-10-CM

## 2021-06-08 NOTE — TELEPHONE ENCOUNTER
I sent prescription to pharmacy. Please inform patient that I will not be able to refill her prescriptions unless seen in the office.

## 2021-06-09 NOTE — TELEPHONE ENCOUNTER
Please inform patient that I sent prescription to pharmacy on 6/7/2021 and the receipt was confirmed by pharmacy. Let me know if any problems.   metFORMIN (GLUCOPHAGE) 500 MG tablet 360 tablet 0 6/7/2021     Sig: TAKE 2 TABLETS BY MOUTH TWICE DAILY    Sent to pharmacy as: metFORMIN HCl 500 MG Oral Tablet (GLUCOPHAGE)    E-Prescribing Status: Receipt confirmed by pharmacy (6/7/2021 10:26 PM EDT)

## 2021-08-29 NOTE — PROGRESS NOTES
Rashi Ball is a 45 y.o. female who is being evaluated for Type 2 diabetes mellitus. Current symptoms/problems include fatigue and are worsening. Controlled type 2 diabetes mellitus without complication, without long-term current use of insulin ([facility].) [E11.9]    Diagnosed with Type 2 diabetes mellitus in 2008. Comorbid conditions: none    Current diabetic medications include: Metformin 500 mg 2 tablets bid  Glucose 142  Took Metformin for a while  Stopped because diabetes  was controlled  Now back on Metformin  No side effects. Intolerance to diabetes medications: No  Has skin reactions  Weight trend: stable  Prior visit with dietician: yes  Current diet: on average, 3 meals per day  Current exercise: active     Current monitoring regimen: home blood tests - 1 times daily  Has brought blood glucose log/meter:  No  Home blood sugar records: fasting range: 130-140 and postprandial range:    Any episodes of hypoglycemia? No  Hypoglycemia frequency and time(s):    Does patient have Glucagon emergency kit? No  Does patient have rapid acting carbohydrate? No  Does patient wear a medic alert bracelet or necklace? Yes    2. Multinodular goiter      This started in 2011. Patient was diagnosed with MNG. The problem has been unchanged. Previous thyroid studies include: TSH and free thyroxine. Patient started medication in N/A. Currently patient is on: N/A. Misses N/A doses a month.     Patient was prescribed levothyroxine, Synthroid in the past and developed rash, hot spots, itching. She took them only for few weeks     Current complaints: fatigue, hair loss, hirsutism, fluctuating mood, constipation, difficulty loosing weight  Dry skin, itching better     History of obstructive symptoms: difficulty swallowing No, changes in voice/hoarseness No.  History of radiation to patient's neck: No  Resent iodine exposure: No  Family history includes hypothyroidism.   Family history of thyroid cancer: No     3. PCOS  Has hirsutism, irregular periods, hair loss, acne  On BCP, continuous pack without period  Differential diagnosis includes PCOS. Started periods at 8 yo. Regular, heavy, alternating with spotting, now no period  In 2017 started BCP. No periods on BCP. Hirsutism on the face, abdomen. Shoe size changed by 1/2 size, ring size changed  Has decreased sex drive     4. Obesity  Gained 15 lbs in 8 lbs. Active  On low carb diet     5. Alopecia  Patient developed severe hair loss. US-PELVIS TRANSVAGINAL NON OB11/12/2018  [place] [facility]  Result Impression   1.  Simple right ovarian cyst measuring 2.7 x 2.7 x 2.7 cm.  2.  Otherwise negative transvaginal ultrasound of the pelvis.            1. ACR TI-RADS risk category: TR3 (3 points), mildly suspicious,  for the left nodule with maximum dimension of 1.0 cm. Recommendation: No further imaging follow-up. Clinical followup as necessary.        Reference: ACR TI-RADS recommendations (Lillysler et al., May 2017):  TR5 (> or = 7 points): FNA if  > or = to 1 cm longest axis, follow-up if 0.5 - 0.9 cm every year for 5 years  TR4 (4-6 points): FNA if  > or = to 1.5 cm longest axis, follow-up if 1 - 1.4 cm in 1, 2, 3 and 5 years  TR3 (3 points): FNA if  > or = to 2.5 cm longest axis, follow-up if 1.5 - 2.4 cm in 1, 3 and 5 years  TR2 (2 points) & TR1 (0 points): No FNA or follow-up recommended    Electronically Signed by: Diana Weinstein MD, 8/13/2020 7:43 PM   Result Narrative   EXAMINATION: US-THYROID / NECK     DATE OF EXAM:  8/13/2020 6:31 PM    DEMOGRAPHICS: 46years old Female     INDICATION:     E04.2: Nontoxic multinodular goiter     Reason for Exam:  Nontoxic multinodular goiter. TECHNIQUE: Ultrasound examination of the thyroid was performed. COMPARISON: None.     FINDINGS:  Thyroid Size: Right lobe: 4.8 x 1.7 x 1.5 cm; Left lobe: 5.3 x 1.4 x 1.3 cm; Isthmus: 4 mm  Texture: Homogeneous  Vascularity: Normal  Total number of nodules of any character:        Nodule #: 1  Size (3 dimensions): 1.0 x 0.7 x 0.7 cm  Location: left      Composition: solid/almost completely solid (2)  Echogenicity: isoechoic (1)  Shape: not taller-than-wide (0)  Margins: smooth (0)  Echogenic foci: none (0)  Total points and TR risk for above nodule: 3 points. ACR TI-RADS 3. Mildly suspicious. Nodule #: 2  Size (3 dimensions): 0.7 x 0.4 x 0.4 cm  Location: right      Composition: mixed cystic and solid (1)  Echogenicity: hypoechoic (2)  Shape: not taller-than-wide (0)  Margins: smooth (0)  Echogenic foci: large comet-tail artifacts (0)  Total points and TR risk for above nodule: 3 points. ACR TI-RADS 3. Mildly suspicious. Additional findings: Additional nonspecific less than 0.5 cm nodules are identified in each lobe.           US-THYROID / [address] [place] [address]  Result Narrative   FINDINGS:  Multiple sonographic images of the thyroid gland were obtained.      The right thyroid lobe measures 4.2 x 2.1 x 1.4 cm.  The left thyroid lobe measures 4.6 x 1.6 x 1.1 cm.  The isthmus measures 4.2 mm. There are multiple hypoechoic nodules diffusely scattered throughout both thyroid lobes.  In the right thyroid lobe, there is a hypoechoic oval nodule in the superior pole measuring 4.8 x 3.4 x 2.9 mm.  In the left thyroid lobe, there is a 3 mm   hypoechoic nodule in the superior pole, a 4 mm hypoechoic nodule in the mid to inferior pole, and a nodule of mixed echogenicity in the inferior pole measuring 7.5 x 6.3 x 4.9 mm.  In addition, the largest hypoechoic nodule is in the right aspect of the   isthmus measuring 1.8 x 1.3 x 0.8 cm. IMPRESSION:  The thyroid gland is enlarged with multiple nodules scattered throughout, as described above.   ** Final **    Dictated by: Karla Baker               @07/08/2011 3:08 pm  Electronically Signed by: Karla Baker  @ 07/08/2011 4:06 pm  Interpreted by Staff Physician: Karla Baker       Past Services:     Active Member of Clubs or Organizations:     Attends Club or Organization Meetings:     Marital Status:    Intimate Partner Violence:     Fear of Current or Ex-Partner:     Emotionally Abused:     Physically Abused:     Sexually Abused:      Family History   Problem Relation Age of Onset    Cancer Mother         breast    Diabetes type 2  Father     Cancer Maternal Grandmother         breast    Cancer Maternal Grandfather         colon     Current Outpatient Medications   Medication Sig Dispense Refill    naproxen (NAPROSYN) 500 MG tablet Take 500 mg by mouth as needed      Dulaglutide (TRULICITY) 9.17 AT/2.8XZ SOPN Inject 0.75 mg into the skin once a week 4 pen 3    metFORMIN (GLUCOPHAGE) 500 MG tablet TAKE 2 TABLETS BY MOUTH TWICE DAILY 360 tablet 0    Probiotic Product (PROBIOTIC-10 PO) Take by mouth      B COMPLEX VITAMINS ER PO Take 1 capsule by mouth daily      desvenlafaxine succinate (PRISTIQ) 25 MG TB24 extended release tablet TAKE 1 TABLET BY MOUTH EVERY DAY      Norethin Ace-Eth Estrad-FE (BLISOVI 24 FE) 1-20 MG-MCG(24) TABS Take 1 tablet by mouth daily      Magnesium 500 MG CAPS Take by mouth       No current facility-administered medications for this visit.      Allergies   Allergen Reactions    Propoxyphene Anaphylaxis    Levothyroxine Rash    Duloxetine      FATIGUE    Sulfa Antibiotics      \"insomnia\"    Codeine Rash    Erythromycin Rash     ABLE TO TOLERATE AZITHROMYCIN    Penicillins Rash     Family Status   Relation Name Status    Mother  (Not Specified)    Father  (Not Specified)    MGM  (Not Specified)    MGF  (Not Specified)       Lab Review:    No results found for: WBC, HGB, HCT, MCV, PLT  Lab Results   Component Value Date     09/27/2020    K 3.9 09/27/2020     09/27/2020    CO2 24 09/27/2020    BUN 16 09/27/2020    GLUCOSE 142 09/27/2020    CALCIUM 9.1 09/27/2020    LABALBU 6.8 09/27/2020    LABALBU 4.4 09/27/2020    BILITOT 0.4 09/27/2020    ALKPHOS 62 09/27/2020    AST 21 09/27/2020    ALT 32 09/27/2020    LABGLOM > 60 09/27/2020    GFRAA > 60 09/27/2020    AGRATIO 1.8 09/27/2020    GLOB 2.4 09/27/2020     Lab Results   Component Value Date    TSH 0.880 09/27/2020    FT3 3.2 09/27/2020     No results found for: LABA1C  No results found for: EAG  No results found for: CHOL  No results found for: TRIG  No results found for: HDL  No results found for: LDLCHOLESTEROL, LDLCALC  No results found for: LABVLDL, VLDL  No results found for: CHOLHDLRATIO  No results found for: [place]  No results found for: VITD25     Review of Systems:  Constitutional: has fatigue, no fever, has recent weight gain, no recent weight loss, no changes in appetite  Eyes: no eye pain, no change in vision, no eye redness, no eye irritation, no double vision  Ears, nose, throat: no nasal congestion, no sore throat, no earache, no decrease in hearing, no hoarseness, no dry mouth, no sinus problems, no difficulty swallowing, no ringing in ears  Pulmonary: no shortness of breath, no wheezing, no dyspnea on exertion, no cough  Cardiovascular: no chest pain, no lower extremity edema, no orthopnea, no intermittent leg claudication, no palpitations  Gastrointestinal: no abdominal pain, no nausea, no vomiting, no diarrhea, has constipation, no dysphagia, no heartburn, no bloating  Genitourinary: no dysuria, no urinary incontinence, no urinary hesitancy, no urinary frequency, no feelings of urinary urgency, no nocturia  Musculoskeletal: no joint swelling, no joint stiffness, no joint pain, no muscle cramps, no muscle pain, no bone pain  Integument/Breast: has hair loss, no skin rashes, no skin lesions, has itching, has dry skin  Neurological: no numbness, no tingling, no weakness, no confusion, no headaches, no dizziness, no fainting, no tremors, no decrease in memory, no balance problems  Psychiatric: no anxiety, no depression, no insomnia  Hematologic/Lymphatic: no tendency for easy bleeding, no swollen lymph nodes, no tendency for easy bruising  Immunology: no seasonal allergies, no frequent infections, no frequent illnesses  Endocrine: no temperature intolerance       /88 (Site: Right Upper Arm, Position: Sitting, Cuff Size: Large Adult)   Pulse 72   Ht 5' 2\" (1.575 m)   Wt 192 lb 9.6 oz (87.4 kg)   SpO2 98%   BMI 35.23 kg/m²    Wt Readings from Last 3 Encounters:   08/30/21 192 lb 9.6 oz (87.4 kg)   12/28/20 197 lb (89.4 kg)   10/09/20 198 lb (89.8 kg)     Body mass index is 35.23 kg/m².       OBJECTIVE:  Constitutional: no acute distress, well appearing and well nourished  Psychiatric: oriented to person, place and time, judgement and insight and normal, recent and remote memory and intact and mood and affect are normal  Skin: skin and subcutaneous tissue is normal without mass, normal turgor, has white stria  Head and Face: examination of head and face revealed no abnormalities  Eyes: no lid or conjunctival swelling, erythema or discharge, pupils are normal, equal, round, reactive to light  Ears/Nose: external inspection of ears and nose revealed no abnormalities, hearing is grossly normal  Oropharynx/Mouth/Face: lips, tongue and gums are normal with no lesions, the voice quality was normal  Neck: neck is supple and symmetric, with midline trachea and no masses, thyroid is enlarged  Lymphatics: normal cervical lymph nodes, normal supraclavicular nodes  Pulmonary: no increased work of breathing or signs of respiratory distress, lungs are clear to auscultation  Cardiovascular: normal heart rate and rhythm, normal S1 and S2, no murmurs and pedal pulses and 2+ bilaterally, No edema  Abdomen: abdomen is soft, non-tender with no masses  Musculoskeletal: normal gait and station and exam of the digits and nails are normal  Neurological: normal coordination and normal general cortical function     Visual inspection:  Deformity/amputation: absent  Skin lesions/pre-ulcerative calluses: absent  Edema: right- negative, left- negative    Sensory exam:  Monofilament sensation: normal  (minimum of 5 random plantar locations tested, avoiding callused areas - > 1 area with absence of sensation is + for neuropathy)    Plus at least one of the following:  Pulses: normal  Proprioception: Intact  Vibration (128 Hz): Intact    ASSESSMENT/PLAN:    1. Controlled type 2 diabetes mellitus without complication, without long-term current use of insulin (HCC)  Higher BG recently. Metformin does not help enough. Call for results  Start Trulicity 0.39 mg weekly  Continue Metformin 500 mg 2 tabs bid  Hemoglobin A1c 5.5  LDL 95  - Comprehensive Metabolic Panel; Future  - HM DIABETES FOOT EXAM  - Lipid Panel; Future  - Microalbumin / Creatinine Urine Ratio; Future  - Hemoglobin A1C; Future  - C-peptide    Call for results, then start Victoza or weekly Trulicity, Ozempic    2. Multinodular goiter  TSH 0.88  8/13/2020 thyroid ultrasound  Left 1 cm and right 0.7 cm thyroid nodules   Thyroid sonogram     3. Obesity  Diet and exercise     4. PCOS  Metformin 500 mg 2 tablets twice a day  Total testosterone 51, elevated  Free testosterone 9.4, elevated  ACTH 35  Prolactin 4.5  Patient is concerned about abnormal hair growth, hair loss, acne  Patient's OB/GYN discussed PCOS with her. Patient stated that she has PCOS according to OB/GYN.  -  DHEA-Sulfate; Future  -  Testosterone, free, total; Future    5. Alopecia  - Comprehensive Metabolic Panel; Future  - T3, Free; Future  - T4, Free; Future  - TSH without Reflex; Future  - DHEA-Sulfate;  Future  - Testosterone, free, total; Future       Reviewed and/or ordered clinical lab results Yes  Reviewed and/or ordered radiology tests Yes  Reviewed and/or ordered other diagnostic tests No  Discussed test results with performing physician No  Independently reviewed image, tracing, or specimen No  Made a decision to obtain old records No  Reviewed and summarized old records Yes  Obtained history from other than patient No    Rashi Ball was counseled regarding symptoms of diabetes, thyroid diagnosis, course and complications of disease if inadequately treated, side effects of medications, diagnosis, treatment options, and prognosis, risks, benefits, complications, and alternatives of treatment, labs, imaging and other studies and treatment targets and goals, diabetes complication prevention, medications for diabetes treatment, side effects, benefits, PCOS. She understands instructions and counseling. These diagnosis were discussed and reviewed with the patient including the advantages of drug therapy. She was counseled at this visit on the following: diabetes complication prevention and foot care. See assessment, plan and counseling note for details    Return in about 3 months (around 11/30/2021) for diabetes.     Electronically signed by Elizabeth Sheehan MD on 8/30/2021 at 7:46 AM

## 2021-08-30 ENCOUNTER — OFFICE VISIT (OUTPATIENT)
Dept: ENDOCRINOLOGY | Age: 41
End: 2021-08-30
Payer: COMMERCIAL

## 2021-08-30 VITALS
OXYGEN SATURATION: 98 % | BODY MASS INDEX: 35.44 KG/M2 | WEIGHT: 192.6 LBS | HEART RATE: 72 BPM | DIASTOLIC BLOOD PRESSURE: 88 MMHG | HEIGHT: 62 IN | SYSTOLIC BLOOD PRESSURE: 122 MMHG

## 2021-08-30 DIAGNOSIS — E28.2 PCOS (POLYCYSTIC OVARIAN SYNDROME): ICD-10-CM

## 2021-08-30 DIAGNOSIS — E04.2 MULTINODULAR GOITER: ICD-10-CM

## 2021-08-30 DIAGNOSIS — E66.9 CLASS 2 OBESITY WITH BODY MASS INDEX (BMI) OF 36.0 TO 36.9 IN ADULT, UNSPECIFIED OBESITY TYPE, UNSPECIFIED WHETHER SERIOUS COMORBIDITY PRESENT: ICD-10-CM

## 2021-08-30 DIAGNOSIS — E11.9 CONTROLLED TYPE 2 DIABETES MELLITUS WITHOUT COMPLICATION, WITHOUT LONG-TERM CURRENT USE OF INSULIN (HCC): Primary | ICD-10-CM

## 2021-08-30 DIAGNOSIS — L65.9 ALOPECIA: ICD-10-CM

## 2021-08-30 PROBLEM — R76.8 ELEVATED ANTINUCLEAR ANTIBODY (ANA) LEVEL: Status: ACTIVE | Noted: 2021-08-30

## 2021-08-30 PROBLEM — M54.50 LOW BACK PAIN: Status: ACTIVE | Noted: 2021-08-30

## 2021-08-30 PROBLEM — F33.1 MODERATE RECURRENT MAJOR DEPRESSION (HCC): Status: ACTIVE | Noted: 2020-02-13

## 2021-08-30 PROCEDURE — 99214 OFFICE O/P EST MOD 30 MIN: CPT | Performed by: INTERNAL MEDICINE

## 2021-08-30 RX ORDER — DULAGLUTIDE 0.75 MG/.5ML
0.75 INJECTION, SOLUTION SUBCUTANEOUS WEEKLY
Qty: 4 PEN | Refills: 3 | Status: SHIPPED | OUTPATIENT
Start: 2021-08-30 | End: 2021-12-03

## 2021-08-30 RX ORDER — NAPROXEN 500 MG/1
500 TABLET ORAL PRN
COMMUNITY
Start: 2021-05-15

## 2021-12-06 DIAGNOSIS — E28.2 PCOS (POLYCYSTIC OVARIAN SYNDROME): ICD-10-CM

## 2021-12-10 ENCOUNTER — VIRTUAL VISIT (OUTPATIENT)
Dept: ENDOCRINOLOGY | Age: 41
End: 2021-12-10
Payer: COMMERCIAL

## 2021-12-10 DIAGNOSIS — E28.2 PCOS (POLYCYSTIC OVARIAN SYNDROME): ICD-10-CM

## 2021-12-10 DIAGNOSIS — E66.9 CLASS 2 OBESITY WITH BODY MASS INDEX (BMI) OF 35.0 TO 35.9 IN ADULT, UNSPECIFIED OBESITY TYPE, UNSPECIFIED WHETHER SERIOUS COMORBIDITY PRESENT: ICD-10-CM

## 2021-12-10 DIAGNOSIS — E04.2 MULTINODULAR GOITER: ICD-10-CM

## 2021-12-10 DIAGNOSIS — E11.9 CONTROLLED TYPE 2 DIABETES MELLITUS WITHOUT COMPLICATION, WITHOUT LONG-TERM CURRENT USE OF INSULIN (HCC): Primary | ICD-10-CM

## 2021-12-10 DIAGNOSIS — L65.9 ALOPECIA: ICD-10-CM

## 2021-12-10 PROCEDURE — 99214 OFFICE O/P EST MOD 30 MIN: CPT | Performed by: INTERNAL MEDICINE

## 2021-12-10 RX ORDER — DULAGLUTIDE 1.5 MG/.5ML
1.5 INJECTION, SOLUTION SUBCUTANEOUS WEEKLY
Qty: 4 PEN | Refills: 3 | Status: SHIPPED | OUTPATIENT
Start: 2021-12-10 | End: 2022-03-18 | Stop reason: SDUPTHER

## 2021-12-10 RX ORDER — DULAGLUTIDE 0.75 MG/.5ML
0.75 INJECTION, SOLUTION SUBCUTANEOUS WEEKLY
Qty: 2 ML | Refills: 0 | Status: CANCELLED | OUTPATIENT
Start: 2021-12-10

## 2021-12-10 NOTE — PROGRESS NOTES
Helen Byrd is a [address] y.o[address] female who is being evaluated for Type 2 diabetes mellitus. 12/10/2021    TELEHEALTH EVALUATION -- Audio/Visual (During XWGOO-17 public health emergency)    Patient provided verbal consent to use the video visit. HPI:    Helen Byrd (:  1980) has requested an audio/video evaluation for the following concern(s):      Current symptoms/problems include fatigue and are worsening. Controlled type 2 diabetes mellitus without complication, without long-term current use of insulin ([facility] [address] [facility] [E11.9]    Diagnosed with Type 2 diabetes mellitus in . Comorbid conditions: none    Current diabetic medications include: Metformin 500 mg 2 tablets bid  Glucose 142  Took Metformin for a while  Stopped because diabetes  was controlled  Now back on Metformin  No side effects. Intolerance to diabetes medications: No  Has skin reactions  Weight trend: stable  Prior visit with dietician: yes  Current diet: on average, 3 meals per day  Current exercise: active     Current monitoring regimen: home blood tests - 1 times daily  Has brought blood glucose log/meter:  No  Home blood sugar records: fasting range: 130-140 and postprandial range:    Any episodes of hypoglycemia? No  Hypoglycemia frequency and time(s):    Does patient have Glucagon emergency kit? No  Does patient have rapid acting carbohydrate? No  Does patient wear a medic alert bracelet or necklace? Yes    2. Multinodular goiter      This started in . Patient was diagnosed with MNG. The problem has been unchanged. Previous thyroid studies include: TSH and free thyroxine. Patient started medication in N/A. Currently patient is on: N/A. Misses N/A doses a month.     Patient was prescribed levothyroxine, Synthroid in the past and developed rash, hot spots, itching.   She took them only for few weeks     Current complaints: fatigue, hair loss, hirsutism, fluctuating mood, constipation, difficulty loosing weight  Dry skin, itching better     History of obstructive symptoms: difficulty swallowing No, changes in voice/hoarseness No.  History of radiation to patient's neck: No  Resent iodine exposure: No  Family history includes hypothyroidism. Family history of thyroid cancer: No     3. PCOS  Has hirsutism, irregular periods, hair loss, acne  On BCP, continuous pack without period  Differential diagnosis includes PCOS. Started periods at 8 yo. Regular, heavy, alternating with spotting, now no period  In 2017 started BCP. No periods on BCP. Hirsutism on the face, abdomen. Shoe size changed by 1/2 size, ring size changed  Has decreased sex drive     4. Obesity  Gained 15 lbs in 8 lbs. Active  On low carb diet     5. Alopecia  Patient developed severe hair loss. US-PELVIS TRANSVAGINAL NON OB11/12/2018  [facility]  Result Impression   1.  Simple right ovarian cyst measuring 2.7 x 2.7 x 2.7 cm.  2.  Otherwise negative transvaginal ultrasound of the pelvis.            1. ACR TI-RADS risk category: TR3 (3 points), mildly suspicious,  for the left nodule with maximum dimension of 1.0 cm. Recommendation: No further imaging follow-up. Clinical followup as necessary.        Reference: ACR TI-RADS recommendations (Tessler et al., May 2017):  TR5 (> or = 7 points): FNA if  > or = to 1 cm longest axis, follow-up if 0.5 - 0.9 cm every year for 5 years  TR4 (4-6 points): FNA if  > or = to 1.5 cm longest axis, follow-up if 1 - 1.4 cm in 1, 2, 3 and 5 years  TR3 (3 points): FNA if  > or = to 2.5 cm longest axis, follow-up if 1.5 - 2.4 cm in 1, 3 and 5 years  TR2 (2 points) & TR1 (0 points): No FNA or follow-up recommended    Electronically Signed by: Sparkle Castellanos MD, 8/13/2020 7:43 PM   Result Narrative   EXAMINATION: US-THYROID / NECK     DATE OF EXAM:  8/13/2020 6:31 PM    DEMOGRAPHICS: 46years old Female     INDICATION:     E04.2: Nontoxic multinodular goiter     Reason for Exam:  Nontoxic multinodular goiter. TECHNIQUE: Ultrasound examination of the thyroid was performed. COMPARISON: None. FINDINGS:  Thyroid Size: Right lobe: 4.8 x 1.7 x 1.5 cm; Left lobe: 5.3 x 1.4 x 1.3 cm; Isthmus: 4 mm  Texture: Homogeneous  Vascularity: Normal  Total number of nodules of any character:        Nodule #: 1  Size (3 dimensions): 1.0 x 0.7 x 0.7 cm  Location: left      Composition: solid/almost completely solid (2)  Echogenicity: isoechoic (1)  Shape: not taller-than-wide (0)  Margins: smooth (0)  Echogenic foci: none (0)  Total points and TR risk for above nodule: 3 points. ACR TI-RADS 3. Mildly suspicious. Nodule #: 2  Size (3 dimensions): 0.7 x 0.4 x 0.4 cm  Location: right      Composition: mixed cystic and solid (1)  Echogenicity: hypoechoic (2)  Shape: not taller-than-wide (0)  Margins: smooth (0)  Echogenic foci: large comet-tail artifacts (0)  Total points and TR risk for above nodule: 3 points. ACR TI-RADS 3. Mildly suspicious. Additional findings: Additional nonspecific less than 0.5 cm nodules are identified in each lobe.           US-THYROID / [address] [place] [address]  Result Narrative   FINDINGS:  Multiple sonographic images of the thyroid gland were obtained.      The right thyroid lobe measures 4.2 x 2.1 x 1.4 cm.  The left thyroid lobe measures 4.6 x 1.6 x 1.1 cm.  The isthmus measures 4.2 mm. There are multiple hypoechoic nodules diffusely scattered throughout both thyroid lobes.  In the right thyroid lobe, there is a hypoechoic oval nodule in the superior pole measuring 4.8 x 3.4 x 2.9 mm.  In the left thyroid lobe, there is a 3 mm   hypoechoic nodule in the superior pole, a 4 mm hypoechoic nodule in the mid to inferior pole, and a nodule of mixed echogenicity in the inferior pole measuring 7.5 x 6.3 x 4.9 mm.  In addition, the largest hypoechoic nodule is in the right aspect of the   isthmus measuring 1.8 x 1.3 x 0.8 cm.     IMPRESSION:  The thyroid gland is enlarged with multiple nodules scattered throughout, as described above. ** Final **    Dictated by: Mor Cruz               @2011 3:08 pm  Electronically Signed by: Mor Cruz  @ 2011 4:06 pm  Interpreted by Staff Physician: Mor Cruz       Past Medical History:   Diagnosis Date    Depression     Diabetes mellitus ([facility]       Patient Active Problem List   Diagnosis    Multinodular goiter    Class 2 obesity in adult    PCOS (polycystic ovarian syndrome)    Alopecia    [facility] mellitus type 2, controlled, [facility] complications [facility]    Elevated antinuclear antibody (EN) level    Low back pain    Moderate recurrent major depression (HCC)     Past Surgical History:   Procedure Laterality Date    BREAST LUMPECTOMY Right     begnin biopsy     SECTION      FINE NEEDLE ASPIRATION      thyroid    LEEP       Social History     Socioeconomic History    Marital status:      Spouse name: Not on file    Number of children: Not on file    Years of education: Not on file    Highest education level: Not on file   Occupational History    Not on file   Tobacco Use    Smoking status: Former Smoker     Packs/day: 1.00     Years: 5.00     Pack years: 5.00     Types: Cigarettes     Quit date: 2011     Years since quitting: 10.2    Smokeless tobacco: Never Used   Vaping Use    Vaping Use: Never used   Substance and Sexual Activity    Alcohol use: Not Currently     Comment: 2-3 per week    Drug use: Never    Sexual activity: Yes     Partners: Male   Other Topics Concern    Not on file   Social History Narrative    Not on file     Social Determinants of Health     Financial Resource Strain:     Difficulty of Paying Living Expenses: Not on file   Food Insecurity:     Worried About Running Out of Food in the Last Year: Not on file    [place] of Food in the Last Year: Not on file   Transportation Needs:     Lack of Transportation (Medical):  Not on file    Lack of Transportation (Non-Medical): Not on file   Physical Activity:     Days of Exercise per Week: Not on file    Minutes of Exercise per Session: Not on file   Stress:     Feeling of Stress : Not on file   Social Connections:     Frequency of Communication with Friends and Family: Not on file    Frequency of Social Gatherings with Friends and Family: Not on file    Attends Catholic Services: Not on file    Active Member of [address] [place] [address] [employer] or Organizations: Not on file    Attends Club or Organization Meetings: Not on file    Marital Status: Not on file   Intimate Partner Violence:     Fear of Current or Ex-Partner: Not on file    Emotionally Abused: Not on file    Physically Abused: Not on file    Sexually Abused: Not on file   Housing Stability:     Unable to Pay for Housing in the Last Year: Not on file    Number of [place] in the Last Year: Not on file    Unstable Housing in the Last Year: Not on file     Family History   Problem Relation Age of Onset    Cancer Mother         breast    Diabetes type 2  Father     Cancer Maternal Grandmother         breast    Cancer Maternal Grandfather         colon     Current Outpatient Medications   Medication Sig Dispense Refill    Dulaglutide (TRULICITY) 1.5 CO/9.1SK SOPN Inject 1.5 mg into the skin once a week 4 pen 3    metFORMIN (GLUCOPHAGE) 500 MG tablet TAKE 2 TABLETS BY MOUTH TWICE DAILY 360 tablet 0    naproxen (NAPROSYN) 500 MG tablet Take 500 mg by mouth as needed      B COMPLEX VITAMINS ER PO Take 1 capsule by mouth daily      desvenlafaxine succinate (PRISTIQ) 25 MG TB24 extended release tablet TAKE 1 TABLET BY MOUTH EVERY DAY      Probiotic Product (PROBIOTIC-10 PO) Take by mouth (Patient not taking: Reported on 12/10/2021)      Magnesium 500 MG CAPS Take by mouth (Patient not taking: Reported on 12/10/2021)       No current facility-administered medications for this visit.      Allergies   Allergen Reactions    Propoxyphene Anaphylaxis    Levothyroxine Rash    Duloxetine      FATIGUE    Sulfa Antibiotics      \"insomnia\"    Codeine Rash    Erythromycin Rash     ABLE TO TOLERATE AZITHROMYCIN    Penicillins Rash     Family Status   Relation Name Status    Mother  (Not Specified)    Father  (Not Specified)    MGM  (Not Specified)    MGF  (Not Specified)       Lab Review:    No results found for: WBC, HGB, HCT, MCV, PLT  Lab Results   Component Value Date     08/30/2021    K 4.6 08/30/2021     08/30/2021    CO2 22 08/30/2021    BUN 15 08/30/2021    CREATININE 0.7 08/30/2021    GLUCOSE 115 08/30/2021    GLUCOSE 142 09/27/2020    CALCIUM 9.2 08/30/2021    PROT 7.0 08/30/2021    LABALBU 4.4 08/30/2021    BILITOT 0.3 08/30/2021    BILITOT 0.4 09/27/2020    ALKPHOS 55 08/30/2021    AST 13 08/30/2021    ALT 15 08/30/2021    LABGLOM >60 08/30/2021    GFRAA >60 08/30/2021    AGRATIO 1.7 08/30/2021    GLOB 2.6 08/30/2021     Lab Results   Component Value Date    TSH 1.03 08/30/2021    FT3 2.7 08/30/2021     No results found for: LABA1C  No results found for: EAG  Lab Results   Component Value Date    CHOL 184 08/30/2021     Lab Results   Component Value Date    TRIG 118 08/30/2021     Lab Results   Component Value Date    HDL 58 08/30/2021     Lab Results   Component Value Date    LDLCALC 102 08/30/2021     Lab Results   Component Value Date    LABVLDL 24 08/30/2021     No results found for: [facility]  Lab Results   Component Value Date    LABMICR <1.20 08/30/2021     No results found for: VITD25     Review of Systems:  Constitutional: has fatigue, no fever, has recent weight gain, no recent weight loss, no changes in appetite  Eyes: no eye pain, no change in vision, no eye redness, no eye irritation, no double vision  Ears, nose, throat: no nasal congestion, no sore throat, no earache, no decrease in hearing, no hoarseness, no dry mouth, no sinus problems, no difficulty swallowing, no ringing in ears  Pulmonary: no shortness of breath, no wheezing, no dyspnea on exertion, no cough  Cardiovascular: no chest pain, no lower extremity edema, no orthopnea, no intermittent leg claudication, no palpitations  Gastrointestinal: no abdominal pain, no nausea, no vomiting, no diarrhea, has constipation, no dysphagia, no heartburn, no bloating  Genitourinary: no dysuria, no urinary incontinence, no urinary hesitancy, no urinary frequency, no feelings of urinary urgency, no nocturia  Musculoskeletal: no joint swelling, no joint stiffness, no joint pain, no muscle cramps, no muscle pain, no bone pain  Integument/Breast: has hair loss, no skin rashes, no skin lesions, has itching, has dry skin  Neurological: no numbness, no tingling, no weakness, no confusion, no headaches, no dizziness, no fainting, no tremors, no decrease in memory, no balance problems  Psychiatric: no anxiety, no depression, no insomnia  Hematologic/Lymphatic: no tendency for easy bleeding, no swollen lymph nodes, no tendency for easy bruising  Immunology: no seasonal allergies, no frequent infections, no frequent illnesses  Endocrine: no temperature intolerance       There were no vitals taken for this visit. Wt Readings from Last 3 Encounters:   08/30/21 192 lb 9.6 oz (87.4 kg)   12/28/20 197 lb (89.4 kg)   10/09/20 198 lb (89.8 kg)     There is no height or weight on file to calculate BMI.       OBJECTIVE:  Constitutional: no apparent distress, well developed and well nourished  Mental status: alert and awake, oriented to person, place and time, able to follow commands  Psychiatric: judgement and insight and normal, recent and remote memory are intact, mood and affect are normal  Skin: skin inspection appears normal, no significant exanthematous lesions or discoloration noted on facial skin  Head and Face: head and face inspection revealed no abnormalities, normocephalic, atraumatic  Eyes: no lid or conjunctival swelling, erythema or discharge, sclera appears normal  Ears/Nose: external inspection of ears and nose revealed no abnormalities, hearing is grossly normal  Oropharynx/Mouth/Face: lips are normal with no lesions, the voice quality was normal  Neck: neck is symmetric, no visualized mass  Pulmonary/chest: respiratory effort normal, no generalized signs of difficulty breathing or signs of respiratory distress  Musculoskeletal: normal station, normal range of motion of neck  Neurological: no facial asymmetry, normal general cortical function    ASSESSMENT/PLAN:    1. Controlled type 2 diabetes mellitus without complication, without long-term current use of insulin (HCC)  Higher BG recently. Metformin   Increase Trulicity to 1.5 mg weekly  Continue Metformin 500 mg 2 tabs bid  Hemoglobin A1c 5.5  LDL 95102  - Comprehensive Metabolic Panel; Future  - Lipid Panel; Future  - Microalbumin / Creatinine Urine Ratio; Future  - Hemoglobin A1C; Future    Call for results, then start Victoza or weekly Trulicity, Ozempi    2. Multinodular goiter  TSH 0.881.03  8/13/2020 thyroid ultrasound  Left 1 cm and right 0.7 cm thyroid nodules   11/12/2021 thyroid ultrasound  Left 0.9 cm nodule with microcalcifications, previously 0.7 cm  I recommend FNA  Thyroid sonogram     3. Obesity  Diet and exercise     4. PCOS  Metformin 500 mg 2 tablets twice a day  DHEA-S 123.0  Total testosterone 5166  Free testosterone 9.45.6  ACTH 3516  Prolactin 4.5  Cortisol 15.8  Patient is concerned about abnormal hair growth, hair loss, acne  Patient's OB/GYN discussed PCOS with her. Patient stated that she has PCOS according to OB/GYN. She had endometrium ablation.  -  DHEA-Sulfate; Future  -  Testosterone, free, total; Future    5. Alopecia  Severe hair loss. Obtain ferritin. - Comprehensive Metabolic Panel; Future  - T3, Free; Future  - T4, Free; Future  - TSH without Reflex; Future  - DHEA-Sulfate;  Future  - Testosterone, free, total; Future       Reviewed and/or ordered clinical lab results Yes  Reviewed and/or ordered radiology tests Yes  Reviewed and/or ordered other diagnostic tests No  Discussed test results with performing physician No  Independently reviewed image, tracing, or specimen No  Made a decision to obtain old records No  Reviewed and summarized old records Yes  Obtained history from other than patient No    Anthony Boo was counseled regarding symptoms of diabetes, thyroid diagnosis, course and complications of disease if inadequately treated, side effects of medications, diagnosis, treatment options, and prognosis, risks, benefits, complications, and alternatives of treatment, labs, imaging and other studies and treatment targets and goals, diabetes complication prevention, medications for diabetes treatment, side effects, benefits, PCOS. She understands instructions and counseling. These diagnosis were discussed and reviewed with the patient including the advantages of drug therapy. She was counseled at this visit on the following: diabetes complication prevention and foot care. See assessment, plan and counseling note for details    Anthony Boo is a 37 y.o. female being evaluated by a Virtual Visit (video visit) encounter, including two-way audio and video communication, in lieu of an in-person visit due to coronavirus emergency, to address concerns as mentioned in history and assessment and plan. Patient identification was verified at the start of the visit. I conducted an interview, performed a limited exam by video and educated the patient on my assessment and plan. Due to this being a TeleHealth encounter (During [place] [address] public health emergency), evaluation of the following organ systems was limited: Vitals/Constitutional/EENT/Resp/CV/GI//MS/Neuro/Skin/Heme-Lymph-Imm.       Pursuant to the emergency declaration under the [address] [place] [address] and the [employer] Act, this Virtual Visit was conducted with patient's (and/or legal guardian's) consent, to reduce the patient's risk of exposure to COVID-19 and provide necessary medical care. The patient (and/or legal guardian) has also been advised to contact this office for worsening conditions or problems, and seek emergency medical treatment and/or call 911 if deemed necessary. Total time spent on this encounter via Telehealth (synchronous, real-time audio/visual connection): 30 min    See assessment, plan and counseling note for counseling and care coordination details. Services were provided through a video synchronous discussion virtually to substitute for in-person clinic visit. Persons participating in the telehealth service: provider - Dayami Oakes MD and patient Mansoor Bowen. Provider was located at her office. Patient was located at home. --Dayami Oakes MD on 12/10/2021 at 7:52 AM    An electronic signature was used to authenticate this note. Return in about 3 months (around 3/10/2022) for diabetes.     Electronically signed by Dayami Oakes MD on 12/10/2021 at 7:52 AM

## 2021-12-27 ENCOUNTER — HOSPITAL ENCOUNTER (OUTPATIENT)
Dept: ULTRASOUND IMAGING | Age: 41
Discharge: HOME | End: 2021-12-27
Payer: COMMERCIAL

## 2021-12-27 DIAGNOSIS — E04.2 MULTINODULAR GOITER: ICD-10-CM

## 2021-12-27 PROCEDURE — 88305 TISSUE EXAM BY PATHOLOGIST: CPT

## 2021-12-27 PROCEDURE — 88173 CYTOPATH EVAL FNA REPORT: CPT

## 2021-12-27 PROCEDURE — 88172 CYTP DX EVAL FNA 1ST EA SITE: CPT

## 2021-12-27 PROCEDURE — 10005 FNA BX W/US GDN 1ST LES: CPT

## 2021-12-31 ENCOUNTER — TELEPHONE (OUTPATIENT)
Dept: ENDOCRINOLOGY | Age: 41
End: 2021-12-31

## 2021-12-31 NOTE — TELEPHONE ENCOUNTER
I left patient a message left thyroid nodule on biopsy came back as benign nodule. Let me know if patient has any questions.

## 2022-03-07 RX ORDER — DULAGLUTIDE 1.5 MG/.5ML
1.5 INJECTION, SOLUTION SUBCUTANEOUS WEEKLY
Refills: 2 | OUTPATIENT
Start: 2022-03-07

## 2022-03-17 ENCOUNTER — PATIENT MESSAGE (OUTPATIENT)
Dept: ENDOCRINOLOGY | Age: 42
End: 2022-03-17

## 2022-03-17 DIAGNOSIS — E11.9 CONTROLLED TYPE 2 DIABETES MELLITUS WITHOUT COMPLICATION, WITHOUT LONG-TERM CURRENT USE OF INSULIN (HCC): Primary | ICD-10-CM

## 2022-03-18 RX ORDER — DULAGLUTIDE 1.5 MG/.5ML
1.5 INJECTION, SOLUTION SUBCUTANEOUS WEEKLY
Qty: 4 PEN | Refills: 3 | Status: CANCELLED | OUTPATIENT
Start: 2022-03-18

## 2022-03-18 RX ORDER — DULAGLUTIDE 1.5 MG/.5ML
1.5 INJECTION, SOLUTION SUBCUTANEOUS WEEKLY
Qty: 4 PEN | Refills: 2 | Status: SHIPPED | OUTPATIENT
Start: 2022-03-18 | End: 2022-06-10

## 2022-03-18 NOTE — TELEPHONE ENCOUNTER
From: Tiffany Esquivel  To: Dr. Rodriguez Finely: 3/17/2022 8:49 PM EDT  Subject: Refill    Hello,    Im calling to schedule my follow up visit in the morning. Could you please refill Trulicity at [facility] in [place]?      Thank you,  Ellena Rubinstein

## 2022-04-30 DIAGNOSIS — E28.2 PCOS (POLYCYSTIC OVARIAN SYNDROME): ICD-10-CM

## 2022-06-10 DIAGNOSIS — E11.9 CONTROLLED TYPE 2 DIABETES MELLITUS WITHOUT COMPLICATION, WITHOUT LONG-TERM CURRENT USE OF INSULIN (HCC): ICD-10-CM

## 2022-06-10 RX ORDER — DULAGLUTIDE 1.5 MG/.5ML
1.5 INJECTION, SOLUTION SUBCUTANEOUS WEEKLY
Qty: 4 PEN | Refills: 0 | Status: SHIPPED | OUTPATIENT
Start: 2022-06-10 | End: 2022-07-11

## 2022-07-10 DIAGNOSIS — E11.9 CONTROLLED TYPE 2 DIABETES MELLITUS WITHOUT COMPLICATION, WITHOUT LONG-TERM CURRENT USE OF INSULIN (HCC): ICD-10-CM

## 2022-07-11 RX ORDER — DULAGLUTIDE 1.5 MG/.5ML
1.5 INJECTION, SOLUTION SUBCUTANEOUS WEEKLY
Qty: 2 ML | Refills: 0 | Status: SHIPPED | OUTPATIENT
Start: 2022-07-11 | End: 2022-08-23 | Stop reason: SDUPTHER

## 2022-08-10 DIAGNOSIS — E11.9 CONTROLLED TYPE 2 DIABETES MELLITUS WITHOUT COMPLICATION, WITHOUT LONG-TERM CURRENT USE OF INSULIN (HCC): ICD-10-CM

## 2022-08-10 RX ORDER — DULAGLUTIDE 1.5 MG/.5ML
1.5 INJECTION, SOLUTION SUBCUTANEOUS WEEKLY
OUTPATIENT
Start: 2022-08-10

## 2022-08-21 DIAGNOSIS — E11.9 CONTROLLED TYPE 2 DIABETES MELLITUS WITHOUT COMPLICATION, WITHOUT LONG-TERM CURRENT USE OF INSULIN (HCC): ICD-10-CM

## 2022-08-22 RX ORDER — DULAGLUTIDE 1.5 MG/.5ML
1.5 INJECTION, SOLUTION SUBCUTANEOUS WEEKLY
OUTPATIENT
Start: 2022-08-22

## 2022-11-11 DIAGNOSIS — E11.9 CONTROLLED TYPE 2 DIABETES MELLITUS WITHOUT COMPLICATION, WITHOUT LONG-TERM CURRENT USE OF INSULIN (HCC): ICD-10-CM

## 2022-11-11 RX ORDER — DULAGLUTIDE 1.5 MG/.5ML
1.5 INJECTION, SOLUTION SUBCUTANEOUS WEEKLY
Qty: 2 ML | Refills: 0 | Status: SHIPPED | OUTPATIENT
Start: 2022-11-11 | End: 2022-12-05 | Stop reason: SDUPTHER

## 2022-12-05 ENCOUNTER — OFFICE VISIT (OUTPATIENT)
Dept: ENDOCRINOLOGY | Age: 42
End: 2022-12-05
Payer: COMMERCIAL

## 2022-12-05 VITALS
DIASTOLIC BLOOD PRESSURE: 70 MMHG | OXYGEN SATURATION: 98 % | BODY MASS INDEX: 31.65 KG/M2 | HEART RATE: 80 BPM | SYSTOLIC BLOOD PRESSURE: 118 MMHG | TEMPERATURE: 98 F | RESPIRATION RATE: 14 BRPM | WEIGHT: 172 LBS | HEIGHT: 62 IN

## 2022-12-05 DIAGNOSIS — E11.9 CONTROLLED TYPE 2 DIABETES MELLITUS WITHOUT COMPLICATION, WITHOUT LONG-TERM CURRENT USE OF INSULIN (HCC): Primary | ICD-10-CM

## 2022-12-05 DIAGNOSIS — L65.9 ALOPECIA: ICD-10-CM

## 2022-12-05 DIAGNOSIS — E66.9 CLASS 1 OBESITY WITH SERIOUS COMORBIDITY AND BODY MASS INDEX (BMI) OF 31.0 TO 31.9 IN ADULT, UNSPECIFIED OBESITY TYPE: ICD-10-CM

## 2022-12-05 DIAGNOSIS — E28.2 PCOS (POLYCYSTIC OVARIAN SYNDROME): ICD-10-CM

## 2022-12-05 DIAGNOSIS — E04.2 MULTINODULAR GOITER: ICD-10-CM

## 2022-12-05 PROBLEM — E66.811 CLASS 1 OBESITY WITH BODY MASS INDEX (BMI) OF 31.0 TO 31.9 IN ADULT: Status: ACTIVE | Noted: 2022-12-05

## 2022-12-05 PROCEDURE — 99214 OFFICE O/P EST MOD 30 MIN: CPT | Performed by: INTERNAL MEDICINE

## 2022-12-05 RX ORDER — BUPROPION HYDROCHLORIDE 300 MG/1
TABLET ORAL
COMMUNITY
Start: 2022-11-11

## 2022-12-05 RX ORDER — M-VIT,TX,IRON,MINS/CALC/FOLIC 27MG-0.4MG
1 TABLET ORAL DAILY
COMMUNITY

## 2022-12-05 RX ORDER — BUPROPION HYDROCHLORIDE 150 MG/1
TABLET ORAL
COMMUNITY
Start: 2022-09-23

## 2022-12-05 RX ORDER — DULAGLUTIDE 1.5 MG/.5ML
1.5 INJECTION, SOLUTION SUBCUTANEOUS WEEKLY
Qty: 2 ML | Refills: 5 | Status: SHIPPED | OUTPATIENT
Start: 2022-12-05

## 2022-12-05 RX ORDER — SPIRONOLACTONE 100 MG/1
100 TABLET, FILM COATED ORAL 2 TIMES DAILY
COMMUNITY

## 2022-12-05 NOTE — PROGRESS NOTES
Kari Eng is a 40 y.o. female who is being evaluated for Type 2 diabetes mellitus. Current symptoms/problems include  fatigue  and are worsening. Controlled type 2 diabetes mellitus without complication, without long-term current use of insulin ([facility] [E11.9]    Diagnosed with Type 2 diabetes mellitus in 2008. Comorbid conditions:  none    Current diabetic medications include: Metformin 500 mg 2 tablets bid  Glucose 142  Took Metformin for a while  Stopped because diabetes  was controlled  Now back on Metformin  No side effects. Intolerance to diabetes medications: No  Has skin reactions  Weight trend: stable  Prior visit with dietician: yes  Current diet: on average, 3 meals per day  Current exercise: active     Current monitoring regimen: home blood tests - 1 times daily  Has brought blood glucose log/meter:  No  Home blood sugar records: fasting range: 130-140 and postprandial range:    Any episodes of hypoglycemia? No  Hypoglycemia frequency and time(s):    Does patient have Glucagon emergency kit? No  Does patient have rapid acting carbohydrate? No  Does patient wear a medic alert bracelet or necklace? Yes    2. Multinodular goiter      This started in 2011. Patient was diagnosed with MNG. The problem has been unchanged. Previous thyroid studies include: TSH and free thyroxine. Patient started medication in N/A. Currently patient is on: N/A. Misses N/A doses a month. Patient was prescribed levothyroxine, Synthroid in the past and developed rash, hot spots, itching. She took them only for few weeks     Current complaints: fatigue, hair loss, hirsutism, fluctuating mood, constipation, difficulty loosing weight  Dry skin, itching better     History of obstructive symptoms: difficulty swallowing No, changes in voice/hoarseness No.  History of radiation to patient's neck: No  Resent iodine exposure: No  Family history includes hypothyroidism.   Family history of thyroid cancer: No 3.  PCOS  Has hirsutism, irregular periods, hair loss, acne  On BCP, continuous pack without period  Differential diagnosis includes PCOS. Started periods at 8 yo. Regular, heavy, alternating with spotting, now no period  In 2017 started BCP. No periods on BCP. Hirsutism on the face, abdomen. Shoe size changed by 1/2 size, ring size changed  Has decreased sex drive     4. Obesity  Gained 15 lbs in 8 lbs. Active  On low carb diet     5. Alopecia  Patient developed severe hair loss. US-PELVIS TRANSVAGINAL NON OB11/12/2018  [facility]  Result Impression   1. Simple right ovarian cyst measuring 2.7 x 2.7 x 2.7 cm.  2.  Otherwise negative transvaginal ultrasound of the pelvis. 1. ACR TI-RADS risk category: TR3 (3 points), mildly suspicious,  for the left nodule with maximum dimension of 1.0 cm. Recommendation: No further imaging follow-up. Clinical followup as necessary. Reference: ACR TI-RADS recommendations (Lillysler et al., May 2017):  TR5 (> or = 7 points): FNA if  > or = to 1 cm longest axis, follow-up if 0.5 - 0.9 cm every year for 5 years  TR4 (4-6 points): FNA if  > or = to 1.5 cm longest axis, follow-up if 1 - 1.4 cm in 1, 2, 3 and 5 years  TR3 (3 points): FNA if  > or = to 2.5 cm longest axis, follow-up if 1.5 - 2.4 cm in 1, 3 and 5 years  TR2 (2 points) & TR1 (0 points): No FNA or follow-up recommended    Electronically Signed by: Natacha Shirley MD, 8/13/2020 7:43 PM   Result Narrative   EXAMINATION: US-THYROID / NECK     DATE OF EXAM:  8/13/2020 6:31 PM    DEMOGRAPHICS: 46years old Female     INDICATION:     E04.2: Nontoxic multinodular goiter     Reason for Exam:  Nontoxic multinodular goiter. TECHNIQUE: Ultrasound examination of the thyroid was performed. COMPARISON: None.     FINDINGS:  Thyroid Size: Right lobe: 4.8 x 1.7 x 1.5 cm; Left lobe: 5.3 x 1.4 x 1.3 cm; Isthmus: 4 mm  Texture: Homogeneous  Vascularity: Normal  Total number of nodules of any character:        Nodule #: 1  Size (3 dimensions): 1.0 x 0.7 x 0.7 cm  Location: left      Composition: solid/almost completely solid (2)  Echogenicity: isoechoic (1)  Shape: not taller-than-wide (0)  Margins: smooth (0)  Echogenic foci: none (0)  Total points and TR risk for above nodule: 3 points. ACR TI-RADS 3. Mildly suspicious. Nodule #: 2  Size (3 dimensions): 0.7 x 0.4 x 0.4 cm  Location: right      Composition: mixed cystic and solid (1)  Echogenicity: hypoechoic (2)  Shape: not taller-than-wide (0)  Margins: smooth (0)  Echogenic foci: large comet-tail artifacts (0)  Total points and TR risk for above nodule: 3 points. ACR TI-RADS 3. Mildly suspicious. Additional findings: Additional nonspecific less than 0.5 cm nodules are identified in each lobe. US-THYROID / [address] [place] [address]  Result Narrative   FINDINGS:  Multiple sonographic images of the thyroid gland were obtained. The right thyroid lobe measures 4.2 x 2.1 x 1.4 cm. The left thyroid lobe measures 4.6 x 1.6 x 1.1 cm. The isthmus measures 4.2 mm. There are multiple hypoechoic nodules diffusely scattered throughout both thyroid lobes. In the right thyroid lobe, there is a hypoechoic oval nodule in the superior pole measuring 4.8 x 3.4 x 2.9 mm. In the left thyroid lobe, there is a 3 mm   hypoechoic nodule in the superior pole, a 4 mm hypoechoic nodule in the mid to inferior pole, and a nodule of mixed echogenicity in the inferior pole measuring 7.5 x 6.3 x 4.9 mm. In addition, the largest hypoechoic nodule is in the right aspect of the   isthmus measuring 1.8 x 1.3 x 0.8 cm. IMPRESSION:  The thyroid gland is enlarged with multiple nodules scattered throughout, as described above.   ** Final **    Dictated by:  Amol Alicea                 @07/08/2011 3:08 pm  Electronically Signed by:  Amol Alicea    @ 07/08/2011 4:06 pm  Interpreted by Staff Physician:  Ramo Ernandez No:    [id number]                  Collected:     2021   [facility] Rec No:    [id number]                 Received:      2021   Attend Phys:   Lance Byrd MD            Completed:     2021   Perform Phys:  Jose Carlos Navarrete MD       FINAL DIAGNOSIS:     Thyroid, Left, Fine Needle Aspiration of 9 mm nodule:   BENIGN      - Compatible with benign thyroid nodule (numerically borderline        adequate benign-appearing follicular epithelial cells).       BRATI/BRATI     Past Medical History:   Diagnosis Date    Depression     Di[facility] mellitus ([facility]       Patient Active Problem List   Diagnosis    Multinodular goiter    Class 2 obesity in adult    PCOS (polycystic ovarian syndrome)    Alopecia    Diabetes mellitus type 2, controlled, without complications (HCC)    Elevated antinuclear antibody (EN) level    Low back pain    Moderate recurrent major depression (HCC)    Class 1 obesity with body mass index (BMI) of 31.0 to 31.9 in adult     Past Surgical History:   Procedure Laterality Date    BREAST LUMPECTOMY Right     begnin biopsy     SECTION      FINE NEEDLE ASPIRATION      thyroid    LEEP       Social History     Socioeconomic History    Marital status:      Spouse name: Not on file    Number of children: Not on file    Years of education: Not on file    Highest education level: Not on file   Occupational History    Not on file   Tobacco Use    Smoking status: Former     Packs/day: 1.00     Years: 5.00     Pack years: 5.00     Types: Cigarettes     Quit date: 2011     Years since quittin.2    Smokeless tobacco: Never   Vaping Use    Vaping Use: Never used   Substance and Sexual Activity    Alcohol use: Not Currently     Comment: 2-3 per week    Drug use: Never    Sexual activity: Yes     Partners: Male   Other Topics Concern    Not on file   Social History Narrative    Not on file     Social Determinants of Health     Financial Resource Strain: Not on file   Food Insecurity: Not on file   Transportation Needs: Not on file   Physical Activity: Not on file   Stress: Not on file   Social Connections: Not on file   Intimate Partner Violence: Not on file   Housing Stability: Not on file     Family History   Problem Relation Age of Onset    Cancer Mother         breast    Diabetes type 2  Father     Cancer Maternal Grandmother         breast    Cancer Maternal Grandfather         colon     Current Outpatient Medications   Medication Sig Dispense Refill    buPROPion (WELLBUTRIN XL) 150 MG extended release tablet TAKE 1 TABLET BY MOUTH EVERY DAY IN THE MORNING      buPROPion (WELLBUTRIN XL) 300 MG extended release tablet TAKE 1 TABLET BY MOUTH EVERY MORNING      spironolactone (ALDACTONE) 100 MG tablet Take 100 mg by mouth 2 times daily      Multiple Vitamins-Minerals (THERAPEUTIC MULTIVITAMIN-MINERALS) tablet Take 1 tablet by mouth daily      dulaglutide (TRULICITY) 1.5 FU/4.9YF SC injection Inject 0.5 mLs into the skin once a week 2 mL 5    naproxen (NAPROSYN) 500 MG tablet Take 500 mg by mouth as needed      B COMPLEX VITAMINS ER PO Take 1 capsule by mouth daily      Probiotic Product (PROBIOTIC-10 PO) Take by mouth (Patient not taking: No sig reported)      desvenlafaxine succinate (PRISTIQ) 25 MG TB24 extended release tablet TAKE 1 TABLET BY MOUTH EVERY DAY (Patient not taking: Reported on 12/5/2022)      Magnesium 500 MG CAPS Take by mouth (Patient not taking: No sig reported)       No current facility-administered medications for this visit.      Allergies   Allergen Reactions    Propoxyphene Anaphylaxis    Levothyroxine Rash    Duloxetine      FATIGUE    Sulfa Antibiotics      \"insomnia\"    Codeine Rash    Erythromycin Rash     ABLE TO TOLERATE AZITHROMYCIN    Penicillins Rash     Family Status   Relation Name Status    Mother  (Not Specified)    Father  (Not Specified)    MGM  (Not Specified)    MGF  (Not Specified)       Lab Review:    No results found for: WBC, HGB, HCT, MCV, PLT  Lab Results   Component Value Date/Time     08/30/2021 08:18 AM    K 4.6 08/30/2021 08:18 AM     08/30/2021 08:18 AM    CO2 22 08/30/2021 08:18 AM    BUN 15 08/30/2021 08:18 AM    CREATININE 0.7 08/30/2021 08:18 AM    GLUCOSE 115 08/30/2021 08:18 AM    GLUCOSE 142 09/27/2020 09:31 AM    CALCIUM 9.2 08/30/2021 08:18 AM    PROT 7.0 08/30/2021 08:18 AM    LABALBU 4.4 08/30/2021 08:18 AM    BILITOT 0.3 08/30/2021 08:18 AM    BILITOT 0.4 09/27/2020 09:31 AM    ALKPHOS 55 08/30/2021 08:18 AM    AST 13 08/30/2021 08:18 AM    ALT 15 08/30/2021 08:18 AM    LABGLOM >60 08/30/2021 08:18 AM    GFRAA >60 08/30/2021 08:18 AM    AGRATIO 1.7 08/30/2021 08:18 AM    GLOB 2.6 08/30/2021 08:18 AM     Lab Results   Component Value Date/Time    TSH 1.03 08/30/2021 08:18 AM    FT3 2.7 08/30/2021 08:18 AM     No results found for: LABA1C  No results found for: EAG  Lab Results   Component Value Date/Time    CHOL 184 08/30/2021 08:18 AM     Lab Results   Component Value Date/Time    TRIG 118 08/30/2021 08:18 AM     Lab Results   Component Value Date/Time    HDL 58 08/30/2021 08:18 AM     Lab Results   Component Value Date/Time    LDLCALC 102 08/30/2021 08:18 AM     Lab Results   Component Value Date/Time    LABVLDL 24 08/30/2021 08:18 AM     No results found for: CHOLHDLRATIO  Lab Results   Component Value Date/Time    LABMICR <1.20 08/30/2021 08:19 AM     No results found for: VITD25     Review of Systems:  Constitutional: has fatigue, no fever, has recent weight gain, no recent weight loss, no changes in appetite  Eyes: no eye pain, no change in vision, no eye redness, no eye irritation, no double vision  Ears, nose, throat: no nasal congestion, no sore throat, no earache, no decrease in hearing, no hoarseness, no dry mouth, no sinus problems, no difficulty swallowing, no ringing in ears  Pulmonary: no shortness of breath, no wheezing, no dyspnea on exertion, no cough  Cardiovascular: no chest pain, no lower extremity edema, no orthopnea, no intermittent leg claudication, no palpitations  Gastrointestinal: no abdominal pain, no nausea, no vomiting, no diarrhea, has constipation, no dysphagia, no heartburn, no bloating  Genitourinary: no dysuria, no urinary incontinence, no urinary hesitancy, no urinary frequency, no feelings of urinary urgency, no nocturia  Musculoskeletal: no joint swelling, no joint stiffness, no joint pain, no muscle cramps, no muscle pain, no bone pain  Integument/Breast: has hair loss, no skin rashes, no skin lesions, has itching, has dry skin  Neurological: no numbness, no tingling, no weakness, no confusion, no headaches, no dizziness, no fainting, no tremors, no decrease in memory, no balance problems  Psychiatric: no anxiety, no depression, no insomnia  Hematologic/Lymphatic: no tendency for easy bleeding, no swollen lymph nodes, no tendency for easy bruising  Immunology: no seasonal allergies, no frequent infections, no frequent illnesses  Endocrine: no temperature intolerance       /70   Pulse 80   Temp 98 °F (36.7 °C)   Resp 14   Ht 5' 2\" (1.575 m)   Wt 172 lb (78 kg)   SpO2 98%   BMI 31.46 kg/m²    Wt Readings from Last 3 Encounters:   12/05/22 172 lb (78 kg)   08/30/21 192 lb 9.6 oz (87.4 kg)   12/28/20 197 lb (89.4 kg)     Body mass index is 31.46 kg/m².       OBJECTIVE:  Constitutional: no acute distress, well appearing and well nourished  Psychiatric: oriented to person, place and time, judgement and insight and normal, recent and remote memory and intact and mood and affect are normal  Skin: skin and subcutaneous tissue is normal without mass, normal turgor, has white stria  Head and Face: examination of head and face revealed no abnormalities  Eyes: no lid or conjunctival swelling, erythema or discharge, pupils are normal, equal, round, reactive to light  Ears/Nose: external inspection of ears and nose revealed no abnormalities, hearing is grossly normal  Oropharynx/Mouth/Face: lips, tongue and gums are normal with no lesions, the voice quality was normal  Neck: neck is supple and symmetric, with midline trachea and no masses, thyroid is enlarged  Lymphatics: normal cervical lymph nodes, normal supraclavicular nodes  Pulmonary: no increased work of breathing or signs of respiratory distress, lungs are clear to auscultation  Cardiovascular: normal heart rate and rhythm, normal S1 and S2, no murmurs and pedal pulses and 2+ bilaterally, No edema  Abdomen: abdomen is soft, non-tender with no masses  Musculoskeletal: normal gait and station and exam of the digits and nails are normal  Neurological: normal coordination and normal general cortical function     Visual inspection:  Deformity/amputation: absent  Skin lesions/pre-ulcerative calluses: absent  Edema: right- negative, left- negative    Sensory exam:  Monofilament sensation: normal  (minimum of 5 random plantar locations tested, avoiding callused areas - > 1 area with absence of sensation is + for neuropathy)    Plus at least one of the following:  Pulses: normal  Proprioception: Intact  Vibration (128 Hz): Intact    ASSESSMENT/PLAN:    1. Controlled type 2 diabetes mellitus without complication, without long-term current use of insulin (HCC)  Metformin - she stopped when started Spironolactone and hair growth improved. Continue Trulicity 1.5 mg weekly  Hemoglobin A1c 5.5-5.0  LDL   - Comprehensive Metabolic Panel; Future  - Lipid Panel; Future  - Microalbumin / Creatinine Urine Ratio; Future  - Hemoglobin A1C; Future     Call for results, then start Victoza or weekly Trulicity, Ozempi     2. Multinodular goiter  TSH 0.88-1.03  8/13/2020 thyroid ultrasound  Left 1 cm and right 0.7 cm thyroid nodules   11/12/2021 thyroid ultrasound  Left 0.9 cm nodule with microcalcifications, previously 0.7 cm  12/27/2021 - benign nodule  -Thyroid sonogram     3. Obesity  Diet and exercise     4.  PCOS  Metformin 500 mg 2 tablets twice a day- was discontinued  DHEA-S 123.0  Total testosterone 51-66  Free testosterone 9.4-5.6  ACTH 35-16  Prolactin 4.5  Cortisol 15.8  Patient is concerned about abnormal hair growth, hair loss, acne  Patient's OB/GYN discussed PCOS with her. Patient stated that she has PCOS according to OB/GYN. She had endometrium ablation.  -  DHEA-Sulfate; Future  -  Testosterone, free, total; Future     5. Alopecia  Improved on Spironolactone. Saw Dermatology. - Comprehensive Metabolic Panel; Future  - T3, Free; Future  - T4, Free; Future  - TSH without Reflex; Future  - DHEA-Sulfate; Future  - Testosterone, free, total; Future       Reviewed and/or ordered clinical lab results Yes  Reviewed and/or ordered radiology tests Yes  Reviewed and/or ordered other diagnostic tests No  Discussed test results with performing physician No  Independently reviewed image, tracing, or specimen No  Made a decision to obtain old records No  Reviewed and summarized old records Yes  Obtained history from other than patient No    Jm Arteaga was counseled regarding symptoms of diabetes, thyroid diagnosis, course and complications of disease if inadequately treated, side effects of medications, diagnosis, treatment options, and prognosis, risks, benefits, complications, and alternatives of treatment, labs, imaging and other studies and treatment targets and goals, diabetes complication prevention, medications for diabetes treatment, side effects, benefits, PCOS. She understands instructions and counseling. These diagnosis were discussed and reviewed with the patient including the advantages of drug therapy. She was counseled at this visit on the following: diabetes complication prevention and foot care. See assessment, plan and counseling note for details    Return in about 6 months (around 6/5/2023) for diabetes.     Electronically signed by Alex Morejon MD on 12/5/2022 at 11:55 AM

## 2023-06-02 DIAGNOSIS — E11.9 CONTROLLED TYPE 2 DIABETES MELLITUS WITHOUT COMPLICATION, WITHOUT LONG-TERM CURRENT USE OF INSULIN (HCC): ICD-10-CM

## 2023-06-02 RX ORDER — DULAGLUTIDE 1.5 MG/.5ML
INJECTION, SOLUTION SUBCUTANEOUS
Qty: 2 ML | Refills: 0 | Status: SHIPPED | OUTPATIENT
Start: 2023-06-02

## 2023-06-02 NOTE — TELEPHONE ENCOUNTER
NOV- 06/05/2023      Requested Prescriptions     Pending Prescriptions Disp Refills    TRULICITY 1.5 II/9.0SG SC injection [Pharmacy Med Name: TRULICITY 1.5 KE/3.1 ML PEN]  5     Sig: INJECT 0.5 ML SUBCUTANEOUSLY ONE TIME PER WEEK

## 2023-06-05 ENCOUNTER — OFFICE VISIT (OUTPATIENT)
Dept: ENDOCRINOLOGY | Age: 43
End: 2023-06-05
Payer: COMMERCIAL

## 2023-06-05 VITALS
TEMPERATURE: 98 F | BODY MASS INDEX: 29.63 KG/M2 | HEART RATE: 74 BPM | DIASTOLIC BLOOD PRESSURE: 62 MMHG | HEIGHT: 62 IN | WEIGHT: 161 LBS | OXYGEN SATURATION: 99 % | SYSTOLIC BLOOD PRESSURE: 88 MMHG | RESPIRATION RATE: 14 BRPM

## 2023-06-05 DIAGNOSIS — E11.9 CONTROLLED TYPE 2 DIABETES MELLITUS WITHOUT COMPLICATION, WITHOUT LONG-TERM CURRENT USE OF INSULIN (HCC): ICD-10-CM

## 2023-06-05 DIAGNOSIS — E04.2 MULTINODULAR GOITER: ICD-10-CM

## 2023-06-05 DIAGNOSIS — E28.2 PCOS (POLYCYSTIC OVARIAN SYNDROME): ICD-10-CM

## 2023-06-05 DIAGNOSIS — L65.9 ALOPECIA: ICD-10-CM

## 2023-06-05 DIAGNOSIS — E11.9 CONTROLLED TYPE 2 DIABETES MELLITUS WITHOUT COMPLICATION, WITHOUT LONG-TERM CURRENT USE OF INSULIN (HCC): Primary | ICD-10-CM

## 2023-06-05 DIAGNOSIS — E66.9 CLASS 1 OBESITY WITH SERIOUS COMORBIDITY AND BODY MASS INDEX (BMI) OF 31.0 TO 31.9 IN ADULT, UNSPECIFIED OBESITY TYPE: ICD-10-CM

## 2023-06-05 LAB
ALBUMIN SERPL-MCNC: 4.5 G/DL (ref 3.4–5)
ALBUMIN/GLOB SERPL: 1.8 {RATIO} (ref 1.1–2.2)
ALP SERPL-CCNC: 69 U/L (ref 40–129)
ALT SERPL-CCNC: 15 U/L (ref 10–40)
ANION GAP SERPL CALCULATED.3IONS-SCNC: 11 MMOL/L (ref 3–16)
AST SERPL-CCNC: 15 U/L (ref 15–37)
BILIRUB SERPL-MCNC: 0.4 MG/DL (ref 0–1)
BUN SERPL-MCNC: 19 MG/DL (ref 7–20)
CALCIUM SERPL-MCNC: 9.5 MG/DL (ref 8.3–10.6)
CHLORIDE SERPL-SCNC: 103 MMOL/L (ref 99–110)
CHOLEST SERPL-MCNC: 154 MG/DL (ref 0–199)
CO2 SERPL-SCNC: 25 MMOL/L (ref 21–32)
CREAT SERPL-MCNC: 1 MG/DL (ref 0.6–1.1)
CREAT UR-MCNC: 189.4 MG/DL (ref 28–259)
EST. AVERAGE GLUCOSE BLD GHB EST-MCNC: 85.3 MG/DL
GFR SERPLBLD CREATININE-BSD FMLA CKD-EPI: >60 ML/MIN/{1.73_M2}
GLUCOSE SERPL-MCNC: 88 MG/DL (ref 70–99)
HBA1C MFR BLD: 4.6 %
HDLC SERPL-MCNC: 66 MG/DL (ref 40–60)
LDL CHOLESTEROL CALCULATED: 73 MG/DL
MICROALBUMIN UR DL<=1MG/L-MCNC: <1.2 MG/DL
MICROALBUMIN/CREAT UR: NORMAL MG/G (ref 0–30)
POTASSIUM SERPL-SCNC: 4.3 MMOL/L (ref 3.5–5.1)
PROT SERPL-MCNC: 7 G/DL (ref 6.4–8.2)
SODIUM SERPL-SCNC: 139 MMOL/L (ref 136–145)
T3FREE SERPL-MCNC: 2.3 PG/ML (ref 2.3–4.2)
T4 FREE SERPL-MCNC: 1.2 NG/DL (ref 0.9–1.8)
TRIGL SERPL-MCNC: 74 MG/DL (ref 0–150)
TSH SERPL DL<=0.005 MIU/L-ACNC: 1.36 UIU/ML (ref 0.27–4.2)
VLDLC SERPL CALC-MCNC: 15 MG/DL

## 2023-06-05 PROCEDURE — 99214 OFFICE O/P EST MOD 30 MIN: CPT | Performed by: INTERNAL MEDICINE

## 2023-06-05 RX ORDER — DEXTROAMPHETAMINE SACCHARATE, AMPHETAMINE ASPARTATE, DEXTROAMPHETAMINE SULFATE AND AMPHETAMINE SULFATE 3.75; 3.75; 3.75; 3.75 MG/1; MG/1; MG/1; MG/1
TABLET ORAL
COMMUNITY
Start: 2022-11-01

## 2023-06-07 LAB
SHBG SERPL-SCNC: 37 NMOL/L (ref 30–135)
TESTOST FREE SERPL-MCNC: <1 PG/ML (ref 1.1–5.8)
TESTOST SERPL-MCNC: 5 NG/DL (ref 20–70)

## 2023-06-08 ENCOUNTER — PATIENT MESSAGE (OUTPATIENT)
Dept: ENDOCRINOLOGY | Age: 43
End: 2023-06-08

## 2023-06-08 LAB — DHEA-S SERPL-MCNC: 108 UG/DL (ref 32–240)

## 2023-06-08 NOTE — TELEPHONE ENCOUNTER
From: Ines Esquivel  To: Dr. Aldana Narayanan: 2023 12:03 PM EDT  Subject: Visit follow-up: Labs Completed    Hello,    I am not sure if all the labs are back yet but I see a number of them are complete. As instructed by Dr Jeronimo Eng I was to reach out to report when those lab results had come back.     Thanks   Laura Rodriguez

## 2023-06-08 NOTE — TELEPHONE ENCOUNTER
Inform patient:  Testosterone was low. May be related to that metformin decreased androgens hopefully that will help with the hair growth, acne, hair loss. DHEA-S was normal.  Hemoglobin A1c very good, 4.6  Cholesterol very good. Protein in the urine negative. Chemistry panel normal.  Thyroid very good.

## 2023-06-09 NOTE — TELEPHONE ENCOUNTER
Hi!     I just wanted to clarify I have not taken Metformin in a very long time. So I am curious how my testosterone level could be that low when I have not taken it in over a year. I stopped in May 2022. Please let me know your thoughts with that piece clarified.

## 2023-06-09 NOTE — TELEPHONE ENCOUNTER
Called patient, answered questions. Possibly related to spironolactone. Consider to decrease the dose of spironolactone and follow-up for fatigue or hair loss. Patient will let me know her decision.

## 2023-06-25 DIAGNOSIS — E11.9 CONTROLLED TYPE 2 DIABETES MELLITUS WITHOUT COMPLICATION, WITHOUT LONG-TERM CURRENT USE OF INSULIN (HCC): ICD-10-CM

## 2023-06-26 RX ORDER — DULAGLUTIDE 1.5 MG/.5ML
INJECTION, SOLUTION SUBCUTANEOUS
Qty: 6 ML | Refills: 1 | Status: SHIPPED | OUTPATIENT
Start: 2023-06-26

## 2023-12-04 PROBLEM — E66.3 OVERWEIGHT (BMI 25.0-29.9): Status: ACTIVE | Noted: 2023-12-04

## 2023-12-05 ENCOUNTER — TELEMEDICINE (OUTPATIENT)
Dept: ENDOCRINOLOGY | Age: 43
End: 2023-12-05
Payer: COMMERCIAL

## 2023-12-05 DIAGNOSIS — E28.2 PCOS (POLYCYSTIC OVARIAN SYNDROME): ICD-10-CM

## 2023-12-05 DIAGNOSIS — E04.2 MULTINODULAR GOITER: ICD-10-CM

## 2023-12-05 DIAGNOSIS — E66.3 OVERWEIGHT (BMI 25.0-29.9): ICD-10-CM

## 2023-12-05 DIAGNOSIS — E11.9 CONTROLLED TYPE 2 DIABETES MELLITUS WITHOUT COMPLICATION, WITHOUT LONG-TERM CURRENT USE OF INSULIN (HCC): Primary | ICD-10-CM

## 2023-12-05 DIAGNOSIS — L65.9 ALOPECIA: ICD-10-CM

## 2023-12-05 PROCEDURE — 3044F HG A1C LEVEL LT 7.0%: CPT | Performed by: INTERNAL MEDICINE

## 2023-12-05 PROCEDURE — 99214 OFFICE O/P EST MOD 30 MIN: CPT | Performed by: INTERNAL MEDICINE

## 2023-12-05 RX ORDER — DULAGLUTIDE 1.5 MG/.5ML
INJECTION, SOLUTION SUBCUTANEOUS
Qty: 6 ML | Refills: 1 | Status: SHIPPED | OUTPATIENT
Start: 2023-12-05

## 2023-12-05 RX ORDER — IBUPROFEN 800 MG/1
800 TABLET ORAL EVERY 6 HOURS PRN
COMMUNITY

## 2023-12-05 NOTE — PROGRESS NOTES
Nathen Conklin is a 44 y.o. female who is being evaluated virtually for Type 2 diabetes mellitus. Current symptoms/problems include  fatigue  and are worsening. Controlled type 2 diabetes mellitus without complication, without long-term current use of insulin ([address]) [E11.9]    Diagnosed with Type 2 diabetes mellitus in 2008. Comorbid conditions:  none    Current diabetic medications include: Trulicity  Took Metformin for a while, now off because diabetes was controlled  Stopped because diabetes  was controlled and started Trulicity    Intolerance to diabetes medications: No  Has skin reactions  Weight trend: stable  Prior visit with dietician: yes  Current diet: on average, 3 meals per day  Current exercise: active     Current monitoring regimen: home blood tests - 1 times daily  Has brought blood glucose log/meter:  No  Home blood sugar records: fasting range: 100-120 and postprandial range:    Any episodes of hypoglycemia? No  Hypoglycemia frequency and time(s):    Does patient have Glucagon emergency kit? No  Does patient have rapid acting carbohydrate? No  Does patient wear a medic alert bracelet or necklace? Yes    2. Multinodular goiter      This started in 2011. Patient was diagnosed with MNG. The problem has been unchanged. Previous thyroid studies include: TSH and free thyroxine. Patient started medication in N/A. Currently patient is on: N/A. Misses N/A doses a month. Patient was prescribed levothyroxine, Synthroid in the past and developed rash, hot spots, itching. She took them only for few weeks     Current complaints: fatigue, hair loss, hirsutism, fluctuating mood, constipation, difficulty loosing weight  Dry skin, itching better     History of obstructive symptoms: difficulty swallowing No, changes in voice/hoarseness No.  History of radiation to patient's neck: No  Resent iodine exposure: No  Family history includes hypothyroidism.   Family history of thyroid cancer: No

## 2024-04-05 ENCOUNTER — TELEPHONE (OUTPATIENT)
Dept: ENDOCRINOLOGY | Age: 44
End: 2024-04-05

## 2024-04-23 ENCOUNTER — OFFICE VISIT (OUTPATIENT)
Dept: ENDOCRINOLOGY | Age: 44
End: 2024-04-23
Payer: COMMERCIAL

## 2024-04-23 VITALS
DIASTOLIC BLOOD PRESSURE: 64 MMHG | WEIGHT: 157 LBS | HEIGHT: 62 IN | BODY MASS INDEX: 28.89 KG/M2 | TEMPERATURE: 98 F | RESPIRATION RATE: 14 BRPM | HEART RATE: 79 BPM | OXYGEN SATURATION: 98 % | SYSTOLIC BLOOD PRESSURE: 105 MMHG

## 2024-04-23 DIAGNOSIS — E11.9 CONTROLLED TYPE 2 DIABETES MELLITUS WITHOUT COMPLICATION, WITHOUT LONG-TERM CURRENT USE OF INSULIN (HCC): Primary | ICD-10-CM

## 2024-04-23 DIAGNOSIS — E28.2 PCOS (POLYCYSTIC OVARIAN SYNDROME): ICD-10-CM

## 2024-04-23 DIAGNOSIS — E66.3 OVERWEIGHT (BMI 25.0-29.9): ICD-10-CM

## 2024-04-23 DIAGNOSIS — E04.2 MULTINODULAR GOITER: ICD-10-CM

## 2024-04-23 DIAGNOSIS — L65.9 ALOPECIA: ICD-10-CM

## 2024-04-23 PROCEDURE — 99214 OFFICE O/P EST MOD 30 MIN: CPT | Performed by: INTERNAL MEDICINE

## 2024-04-23 RX ORDER — DULAGLUTIDE 0.75 MG/.5ML
INJECTION, SOLUTION SUBCUTANEOUS
Qty: 6 ML | Refills: 1 | Status: SHIPPED | OUTPATIENT
Start: 2024-04-23

## 2024-04-23 NOTE — PROGRESS NOTES
testosterone 51-66-58-13  Free testosterone 9.4-5.6-<1-1.0  ACTH 35-16  Prolactin 4.5  Cortisol 15.8  Patient is concerned about abnormal hair growth, hair loss, acne  Patient's OB/GYN discussed PCOS with her.  Patient stated that she has PCOS according to OB/GYN.  She had endometrium ablation, had endometriosis removal.  Hair loss is related to pain.  Had laparoscopic procedure in 12/2023 for adhesions for endometriosis.  Takes some supplements.  -  DHEA-Sulfate; Future  -  Testosterone, free, total; Future     5. Alopecia  Saw Dermatology.  Continue Spironolactone.  Continue Nutrafol, effective.  Improved.  - Comprehensive Metabolic Panel; Future  - DHEA-Sulfate; Future  - Testosterone, free, total; Future    Reviewed and/or ordered clinical lab results Yes  Reviewed and/or ordered radiology tests Yes  Reviewed and/or ordered other diagnostic tests No  Discussed test results with performing physician No  Independently reviewed image, tracing, or specimen No  Made a decision to obtain old records No  Reviewed and summarized old records Yes  Obtained history from other than patient No    Deepika Contehmarsha was counseled regarding symptoms of diabetes, thyroid diagnosis, course and complications of disease if inadequately treated, side effects of medications, diagnosis, treatment options, and prognosis, risks, benefits, complications, and alternatives of treatment, labs, imaging and other studies and treatment targets and goals, diabetes complication prevention, medications for diabetes treatment, side effects, benefits, PCOS.  She understands instructions and counseling.    These diagnosis were discussed and reviewed with the patient including the advantages of drug therapy. She was counseled at this visit on the following: diabetes complication prevention and foot care.    See assessment, plan and counseling note for details    Return in about 6 months (around 10/23/2024) for diabetes.    Electronically signed by Sangeetha

## 2024-08-12 DIAGNOSIS — E11.9 CONTROLLED TYPE 2 DIABETES MELLITUS WITHOUT COMPLICATION, WITHOUT LONG-TERM CURRENT USE OF INSULIN (HCC): ICD-10-CM

## 2024-08-12 RX ORDER — DULAGLUTIDE 1.5 MG/.5ML
INJECTION, SOLUTION SUBCUTANEOUS
Qty: 2 ML | Refills: 0 | Status: SHIPPED | OUTPATIENT
Start: 2024-08-12

## 2024-10-08 ENCOUNTER — OFFICE VISIT (OUTPATIENT)
Dept: ENDOCRINOLOGY | Age: 44
End: 2024-10-08
Payer: COMMERCIAL

## 2024-10-08 VITALS
SYSTOLIC BLOOD PRESSURE: 112 MMHG | RESPIRATION RATE: 14 BRPM | HEART RATE: 82 BPM | HEIGHT: 62 IN | BODY MASS INDEX: 29.08 KG/M2 | OXYGEN SATURATION: 99 % | DIASTOLIC BLOOD PRESSURE: 77 MMHG | WEIGHT: 158 LBS | TEMPERATURE: 98 F

## 2024-10-08 DIAGNOSIS — L65.9 ALOPECIA: ICD-10-CM

## 2024-10-08 DIAGNOSIS — E04.2 MULTINODULAR GOITER: ICD-10-CM

## 2024-10-08 DIAGNOSIS — E66.3 OVERWEIGHT (BMI 25.0-29.9): ICD-10-CM

## 2024-10-08 DIAGNOSIS — E11.9 CONTROLLED TYPE 2 DIABETES MELLITUS WITHOUT COMPLICATION, WITHOUT LONG-TERM CURRENT USE OF INSULIN (HCC): Primary | ICD-10-CM

## 2024-10-08 DIAGNOSIS — E28.2 PCOS (POLYCYSTIC OVARIAN SYNDROME): ICD-10-CM

## 2024-10-08 PROCEDURE — 99214 OFFICE O/P EST MOD 30 MIN: CPT | Performed by: INTERNAL MEDICINE

## 2024-10-08 RX ORDER — DULAGLUTIDE 1.5 MG/.5ML
INJECTION, SOLUTION SUBCUTANEOUS
Qty: 6 ML | Refills: 1 | Status: SHIPPED | OUTPATIENT
Start: 2024-10-08

## 2024-10-08 NOTE — PROGRESS NOTES
BILITOT 0.4 09/27/2020 09:31 AM    ALKPHOS 69 06/05/2023 08:19 AM    AST 15 06/05/2023 08:19 AM    ALT 15 06/05/2023 08:19 AM    LABGLOM >60 06/05/2023 08:19 AM    GFRAA >60 08/30/2021 08:18 AM    AGRATIO 1.8 06/05/2023 08:19 AM    GLOB 2.6 08/30/2021 08:18 AM     Lab Results   Component Value Date/Time    TSH 1.36 06/05/2023 08:19 AM    FT3 2.3 06/05/2023 08:19 AM     Lab Results   Component Value Date/Time    LABA1C 4.6 06/05/2023 08:19 AM     Lab Results   Component Value Date/Time    EAG 85.3 06/05/2023 08:19 AM     Lab Results   Component Value Date/Time    CHOL 184 08/30/2021 08:18 AM     Lab Results   Component Value Date/Time    TRIG 118 08/30/2021 08:18 AM     Lab Results   Component Value Date/Time    HDL 66 06/05/2023 08:19 AM     No components found for: \"LDLCHOLESTEROL\", \"LDLCALC\"    Lab Results   Component Value Date/Time    VLDL 15 06/05/2023 08:19 AM       No results found for: \"CHOLHDLRATIO\"  No results found for: \"TEMD62NHU\"    No results found for: \"VITD25\"     Review of Systems:  Constitutional: has fatigue, no fever, has recent weight gain, no recent weight loss, no changes in appetite  Eyes: no eye pain, no change in vision, no eye redness, no eye irritation, no double vision  Ears, nose, throat: no nasal congestion, no sore throat, no earache, no decrease in hearing, no hoarseness, no dry mouth, no sinus problems, no difficulty swallowing, no ringing in ears  Pulmonary: no shortness of breath, no wheezing, no dyspnea on exertion, no cough  Cardiovascular: no chest pain, no lower extremity edema, no orthopnea, no intermittent leg claudication, no palpitations  Gastrointestinal: no abdominal pain, no nausea, no vomiting, no diarrhea, has constipation, no dysphagia, no heartburn, no bloating  Genitourinary: no dysuria, no urinary incontinence, no urinary hesitancy, no urinary frequency, no feelings of urinary urgency, no nocturia  Musculoskeletal: no joint swelling, no joint stiffness, no joint

## 2025-04-08 ENCOUNTER — OFFICE VISIT (OUTPATIENT)
Dept: ENDOCRINOLOGY | Age: 45
End: 2025-04-08
Payer: COMMERCIAL

## 2025-04-08 VITALS
HEART RATE: 75 BPM | SYSTOLIC BLOOD PRESSURE: 91 MMHG | OXYGEN SATURATION: 98 % | RESPIRATION RATE: 14 BRPM | BODY MASS INDEX: 29.08 KG/M2 | WEIGHT: 158 LBS | HEIGHT: 62 IN | TEMPERATURE: 98 F | DIASTOLIC BLOOD PRESSURE: 64 MMHG

## 2025-04-08 DIAGNOSIS — L65.9 ALOPECIA: ICD-10-CM

## 2025-04-08 DIAGNOSIS — E66.3 OVERWEIGHT (BMI 25.0-29.9): ICD-10-CM

## 2025-04-08 DIAGNOSIS — E28.2 PCOS (POLYCYSTIC OVARIAN SYNDROME): ICD-10-CM

## 2025-04-08 DIAGNOSIS — E66.811 CLASS 1 OBESITY WITH SERIOUS COMORBIDITY AND BODY MASS INDEX (BMI) OF 31.0 TO 31.9 IN ADULT, UNSPECIFIED OBESITY TYPE: ICD-10-CM

## 2025-04-08 DIAGNOSIS — E04.2 MULTINODULAR GOITER: ICD-10-CM

## 2025-04-08 DIAGNOSIS — E11.9 CONTROLLED TYPE 2 DIABETES MELLITUS WITHOUT COMPLICATION, WITHOUT LONG-TERM CURRENT USE OF INSULIN: ICD-10-CM

## 2025-04-08 DIAGNOSIS — E11.9 CONTROLLED TYPE 2 DIABETES MELLITUS WITHOUT COMPLICATION, WITHOUT LONG-TERM CURRENT USE OF INSULIN: Primary | ICD-10-CM

## 2025-04-08 PROCEDURE — 99214 OFFICE O/P EST MOD 30 MIN: CPT | Performed by: INTERNAL MEDICINE

## 2025-04-08 RX ORDER — PROGESTERONE 200 MG/1
1 CAPSULE ORAL NIGHTLY
COMMUNITY
Start: 2025-01-24

## 2025-04-08 RX ORDER — ESTRADIOL 0.1 MG/G
2 CREAM VAGINAL DAILY
COMMUNITY

## 2025-04-08 NOTE — PROGRESS NOTES
SpO2 98%   BMI 28.89 kg/m²    Wt Readings from Last 3 Encounters:   04/08/25 71.7 kg (158 lb)   10/08/24 71.7 kg (158 lb)   04/23/24 71.2 kg (157 lb)     Body mass index is 28.89 kg/m².      OBJECTIVE:  Constitutional: no acute distress, well appearing and well nourished  Psychiatric: oriented to person, place and time, judgement and insight and normal, recent and remote memory and intact and mood and affect are normal  Skin: skin and subcutaneous tissue is normal without mass, normal turgor, has white stria  Head and Face: examination of head and face revealed no abnormalities  Eyes: no lid or conjunctival swelling, erythema or discharge, pupils are normal, equal, round, reactive to light  Ears/Nose: external inspection of ears and nose revealed no abnormalities, hearing is grossly normal  Oropharynx/Mouth/Face: lips, tongue and gums are normal with no lesions, the voice quality was normal  Neck: neck is supple and symmetric, with midline trachea and no masses, thyroid is enlarged  Lymphatics: normal cervical lymph nodes, normal supraclavicular nodes  Pulmonary: no increased work of breathing or signs of respiratory distress, lungs are clear to auscultation  Cardiovascular: normal heart rate and rhythm, normal S1 and S2, no murmurs and pedal pulses and 2+ bilaterally, No edema  Abdomen: abdomen is soft, non-tender with no masses  Musculoskeletal: normal gait and station and exam of the digits and nails are normal  Neurological: normal coordination and normal general cortical function     Visual inspection:  Deformity/amputation: absent  Skin lesions/pre-ulcerative calluses: absent  Edema: right- negative, left- negative    Sensory exam:  Monofilament sensation: normal  (minimum of 5 random plantar locations tested, avoiding callused areas - > 1 area with absence of sensation is + for neuropathy)    Plus at least one of the following:  Pulses: normal  Proprioception: Intact  Vibration (128 Hz):

## 2025-04-09 LAB
ALBUMIN SERPL-MCNC: 4.5 G/DL (ref 3.4–5)
ALBUMIN/GLOB SERPL: 2 {RATIO} (ref 1.1–2.2)
ALP SERPL-CCNC: 63 U/L (ref 40–129)
ALT SERPL-CCNC: 17 U/L (ref 10–40)
ANION GAP SERPL CALCULATED.3IONS-SCNC: 10 MMOL/L (ref 3–16)
AST SERPL-CCNC: 15 U/L (ref 15–37)
BILIRUB SERPL-MCNC: 0.4 MG/DL (ref 0–1)
BUN SERPL-MCNC: 14 MG/DL (ref 7–20)
CALCIUM SERPL-MCNC: 9.8 MG/DL (ref 8.3–10.6)
CHLORIDE SERPL-SCNC: 104 MMOL/L (ref 99–110)
CHOLEST SERPL-MCNC: 150 MG/DL (ref 0–199)
CO2 SERPL-SCNC: 26 MMOL/L (ref 21–32)
CREAT SERPL-MCNC: 1 MG/DL (ref 0.6–1.1)
EST. AVERAGE GLUCOSE BLD GHB EST-MCNC: 73.8 MG/DL
GFR SERPLBLD CREATININE-BSD FMLA CKD-EPI: 71 ML/MIN/{1.73_M2}
GLUCOSE SERPL-MCNC: 79 MG/DL (ref 70–99)
HBA1C MFR BLD: 4.2 %
HDLC SERPL-MCNC: 65 MG/DL (ref 40–60)
LDLC SERPL CALC-MCNC: 73 MG/DL
POTASSIUM SERPL-SCNC: 4.5 MMOL/L (ref 3.5–5.1)
PROGEST SERPL-MCNC: 4.56 NG/ML
PROT SERPL-MCNC: 6.7 G/DL (ref 6.4–8.2)
SODIUM SERPL-SCNC: 140 MMOL/L (ref 136–145)
T3FREE SERPL-MCNC: 3 PG/ML (ref 2.3–4.2)
T4 FREE SERPL-MCNC: 1.2 NG/DL (ref 0.9–1.8)
TRIGL SERPL-MCNC: 62 MG/DL (ref 0–150)
TSH SERPL DL<=0.005 MIU/L-ACNC: 1.66 UIU/ML (ref 0.27–4.2)
VLDLC SERPL CALC-MCNC: 12 MG/DL

## 2025-04-10 ENCOUNTER — PATIENT MESSAGE (OUTPATIENT)
Dept: ENDOCRINOLOGY | Age: 45
End: 2025-04-10

## 2025-04-10 DIAGNOSIS — E11.9 CONTROLLED TYPE 2 DIABETES MELLITUS WITHOUT COMPLICATION, WITHOUT LONG-TERM CURRENT USE OF INSULIN: Primary | ICD-10-CM

## 2025-04-10 LAB
DHEA-S SERPL-MCNC: 137 UG/DL (ref 60.9–337)
SHBG SERPL-SCNC: 52 NMOL/L (ref 25–122)
TESTOST FREE SERPL-MCNC: 7.4 PG/ML (ref 1.1–5.8)
TESTOST SERPL-MCNC: 55 NG/DL (ref 8–48)

## 2025-04-10 NOTE — TELEPHONE ENCOUNTER
Good morning,     I was reviewing my test results and I’m wondering if I should stop Trulicity? With my A1C being good but certainly lower and it keeps going lower over time. Is there a need for my to continue with it?      Thanks,  Deepika

## 2025-04-10 NOTE — TELEPHONE ENCOUNTER
I spoke with patient.  She stated that her weight loss happened not from Trulicity, but when she started diet and exercise.  She prefers to decrease Trulicity dose.  Sent prescription to pharmacy for Trulicity 0.75 mg weekly.  Advised patient that if she does not feel that it makes any difference, it is okay to discontinue.  She may consider sooner appointment to make sure that remains in good control.

## 2025-04-12 LAB
ESTRADIOL SERPL HS-MCNC: 42.7 PG/ML
ESTROGEN SERPL CALC-MCNC: 74.7 PG/ML
ESTRONE SERPL-MCNC: 32 PG/ML

## 2025-06-07 DIAGNOSIS — E11.9 CONTROLLED TYPE 2 DIABETES MELLITUS WITHOUT COMPLICATION, WITHOUT LONG-TERM CURRENT USE OF INSULIN (HCC): ICD-10-CM

## 2025-06-09 RX ORDER — DULAGLUTIDE 0.75 MG/.5ML
INJECTION, SOLUTION SUBCUTANEOUS
Qty: 6 ML | Refills: 1 | Status: SHIPPED | OUTPATIENT
Start: 2025-06-09

## 2025-06-09 NOTE — TELEPHONE ENCOUNTER
Medication:   Requested Prescriptions     Pending Prescriptions Disp Refills    TRULICITY 0.75 MG/0.5ML SOAJ SC injection [Pharmacy Med Name: TRULICITY 0.75 MG/0.5 ML PEN]       Sig: INJECT 0.5 ML INTO THE SKIN EVERY 7 DAYS       Last Filled:      Patient Phone Number: [phone] (home) [phone] (work)    Last appt: 4/8/2025   Next appt: 10/13/2025    Last Labs DM:   Lab Results   Component Value Date/Time    LABA1C 4.2 04/08/2025 08:16 AM